# Patient Record
Sex: FEMALE | Employment: FULL TIME | URBAN - METROPOLITAN AREA
[De-identification: names, ages, dates, MRNs, and addresses within clinical notes are randomized per-mention and may not be internally consistent; named-entity substitution may affect disease eponyms.]

---

## 2019-05-22 ENCOUNTER — TELEPHONE (OUTPATIENT)
Dept: PSYCHIATRY | Facility: CLINIC | Age: 19
End: 2019-05-22

## 2019-05-22 NOTE — TELEPHONE ENCOUNTER
"PSYCHIATRY CLINIC PHONE INTAKE     SERVICES REQUESTED / INTERESTED IN          Med Management    Presenting Problem and Brief History                              What would you like to be seen for? (brief description):  Patient was diagnosed with depression about 3-4 years ago by her previous therapist. She has thoughts of jazmin \"here and there\" which have gotten worse within the past month. Jazmin lasts a couple days, \"not too extreme\", stays up all night, has racing thoughts, and a lot of energy. Previous therapist thought it might be cyclothymia but did not officially diagnose that. Past medications include citalopram and later lexapro with trazodone. Medications helped a lot but she stopped going to therapy and refilling prescriptions so she is no longer on any medication. She said she felt ok until the beginning of this year but is having symptoms again.  Have you received a mental health diagnosis? Yes   Which one (s): Depression  Is there any history of developmental delay?  No   Are you currently seeing a mental health provider?  Yes            Who / month last seen:  [facility]  Do you have mental health records elsewhere?  Yes  Will you sign a release so we can obtain them?  Yes    Have you ever been hospitalized for psychiatric reasons?  No  Describe:      Do you have current thoughts of self-harm?  Yes  - thoughts but no plan  Do you currently have thoughts of harming others?  No       Substance Use History     Do you have any history of alcohol / illicit drug use?  No  Describe:    Have you ever received treatment for this?  No    Describe:       Social History     Does the patient have a guardian?  No    Name / number:   Have you had an ACT team in last 12 months?  No  Describe:    Do you have any current or past legal issues?  No  Describe:    OK to leave a detailed voicemail?  Yes    Medical/ Surgical History                                 There is no problem list on file for this " patient.         Medications             No current outpatient medications on file.         DISPOSITION      Completed phone screen with patient. Added to AGE wait list for first available provider.    Ingris Bella,
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
## 2019-08-02 ENCOUNTER — OFFICE VISIT (OUTPATIENT)
Dept: PSYCHIATRY | Facility: CLINIC | Age: 19
End: 2019-08-02
Attending: PSYCHIATRY & NEUROLOGY
Payer: COMMERCIAL

## 2019-08-02 ENCOUNTER — TELEPHONE (OUTPATIENT)
Dept: PSYCHIATRY | Facility: CLINIC | Age: 19
End: 2019-08-02

## 2019-08-02 VITALS — SYSTOLIC BLOOD PRESSURE: 119 MMHG | DIASTOLIC BLOOD PRESSURE: 67 MMHG | HEART RATE: 80 BPM | WEIGHT: 102.8 LBS

## 2019-08-02 DIAGNOSIS — F32.A DEPRESSION, UNSPECIFIED DEPRESSION TYPE: Primary | ICD-10-CM

## 2019-08-02 PROBLEM — J45.909 ASTHMA: Status: ACTIVE | Noted: 2019-08-02

## 2019-08-02 RX ORDER — ALBUTEROL SULFATE 90 UG/1
2 AEROSOL, METERED RESPIRATORY (INHALATION) EVERY 6 HOURS PRN
Status: ON HOLD | COMMUNITY
End: 2021-12-16

## 2019-08-02 RX ORDER — ESCITALOPRAM OXALATE 10 MG/1
10 TABLET ORAL DAILY
Qty: 30 TABLET | Refills: 1 | Status: SHIPPED | OUTPATIENT
Start: 2019-08-02 | End: 2019-10-23

## 2019-08-02 ASSESSMENT — ANXIETY QUESTIONNAIRES
IF YOU CHECKED OFF ANY PROBLEMS ON THIS QUESTIONNAIRE, HOW DIFFICULT HAVE THESE PROBLEMS MADE IT FOR YOU TO DO YOUR WORK, TAKE CARE OF THINGS AT HOME, OR GET ALONG WITH OTHER PEOPLE: VERY DIFFICULT
6. BECOMING EASILY ANNOYED OR IRRITABLE: MORE THAN HALF THE DAYS
7. FEELING AFRAID AS IF SOMETHING AWFUL MIGHT HAPPEN: MORE THAN HALF THE DAYS
5. BEING SO RESTLESS THAT IT IS HARD TO SIT STILL: NEARLY EVERY DAY
2. NOT BEING ABLE TO STOP OR CONTROL WORRYING: MORE THAN HALF THE DAYS
1. FEELING NERVOUS, ANXIOUS, OR ON EDGE: MORE THAN HALF THE DAYS
3. WORRYING TOO MUCH ABOUT DIFFERENT THINGS: MORE THAN HALF THE DAYS
GAD7 TOTAL SCORE: 15

## 2019-08-02 ASSESSMENT — PATIENT HEALTH QUESTIONNAIRE - PHQ9
SUM OF ALL RESPONSES TO PHQ QUESTIONS 1-9: 23
5. POOR APPETITE OR OVEREATING: MORE THAN HALF THE DAYS

## 2019-08-02 NOTE — PATIENT INSTRUCTIONS
- Start Lexapro 10 mg daily    - Come back in 1 month    Thank you for coming to the PSYCHIATRY CLINIC.    Lab Testing:  If you had lab testing today and your results are reassuring or normal they will be mailed to you or sent through [employer] within 7 days.   If the lab tests need quick action we will call you with the results.  The phone number we will call with results is # [phone] (home) . If this is not the best number please call our clinic and change the number.    Medication Refills:  If you need any refills please call your pharmacy and they will contact us. Our fax number for refills is [phone]. Please allow three business for refill processing.   If you need to  your refill at a new pharmacy, please contact the new pharmacy directly. The new pharmacy will help you get your medications transferred.     Scheduling:  If you have any concerns about today's visit or wish to schedule another appointment please call our office during normal business hours [phone] (8-5:00 M-F)    Contact Us:  Please call [phone] during business hours (8-5:00 M-F).  If after clinic hours, or on the weekend, please call  [phone].    Financial Assistance [phone]  [facility] Billing [phone]  [facility] Billing [facility], [facility]: [phone]  [facility]illing [phone]  Medical Records [phone]      MENTAL HEALTH CRISIS NUMBERS:  [facility]:   [facility] - [phone]   Crisis Residence [facility] - [phone]   Walk-In Counseling [facility] [phone]   COPE 24/7 [place] Mobile Team for Adults - [[phone]]; Child - [[phone]]        [facility]:   [facility] - [phone]   Walk-in counseling [facility] - [phone]   Walk-in counseling [facility] - [phone]   Crisis Residence [facility] - [phone]   Urgent Care Adult Mental Health:   --Drop-in, 24/7  crisis line, and [facility] Mobile Team [[phone]]    CRISIS TEXT LINE: Text [phone] from anywhere, anytime, any crisis 24/7;    OR SEE www.crisistextline.org     Poison Control Center - [phone]    CHILD: [facility] needs assessment team - [phone]     [facility] Lifeline - [phone]; or [facility] Lifeline - [phone]    If you have a medical emergency please call 911or go to the nearest ER.                    _____________________________________________    Again thank you for choosing PSYCHIATRY CLINIC and please let us know how we can best partner with you to improve you and your family's health.  You may be receiving a survey in the mail regarding this appointment. We would love to have your feedback, both positive and negative, so please fill out the survey and return it using the provided envelope. The survey is done by an external company, so your answers are anonymous.

## 2019-08-02 NOTE — TELEPHONE ENCOUNTER
On 8/2/2019 the patient signed an YOVANI authorizing medical records to be released from [facility] to [facility]  for the purpose of continuing care. I faxed the request to [phone]. I sent the original to YOVANI to scanning and kept a copy in psychiatry until scanning is complete.  Nora Vickers, CMA

## 2019-08-03 ASSESSMENT — ANXIETY QUESTIONNAIRES: GAD7 TOTAL SCORE: 15

## 2019-09-04 ENCOUNTER — TELEPHONE (OUTPATIENT)
Dept: PSYCHIATRY | Facility: CLINIC | Age: 19
End: 2019-09-04

## 2019-09-04 ENCOUNTER — HOSPITAL ENCOUNTER (EMERGENCY)
Facility: CLINIC | Age: 19
Discharge: HOME | End: 2019-09-04
Attending: EMERGENCY MEDICINE | Admitting: EMERGENCY MEDICINE
Payer: COMMERCIAL

## 2019-09-04 VITALS
HEART RATE: 80 BPM | DIASTOLIC BLOOD PRESSURE: 54 MMHG | SYSTOLIC BLOOD PRESSURE: 95 MMHG | TEMPERATURE: 98.1 F | OXYGEN SATURATION: 100 % | RESPIRATION RATE: 18 BRPM

## 2019-09-04 DIAGNOSIS — N12 PYELONEPHRITIS: ICD-10-CM

## 2019-09-04 LAB
ALBUMIN UR-MCNC: 10 MG/DL
ANION GAP SERPL CALCULATED.3IONS-SCNC: 8 MMOL/L (ref 3–14)
APPEARANCE UR: ABNORMAL
BACTERIA #/AREA URNS HPF: ABNORMAL /HPF
BASOPHILS # BLD AUTO: 0 10E9/L (ref 0–0.2)
BASOPHILS NFR BLD AUTO: 0.4 %
BILIRUB UR QL STRIP: NEGATIVE
BUN SERPL-MCNC: 9 MG/DL (ref 7–30)
CALCIUM SERPL-MCNC: 8.2 MG/DL (ref 8.5–10.1)
CHLORIDE SERPL-SCNC: 102 MMOL/L (ref 94–109)
CO2 SERPL-SCNC: 29 MMOL/L (ref 20–32)
COLOR UR AUTO: ABNORMAL
CREAT SERPL-MCNC: 0.64 MG/DL (ref 0.52–1.04)
DIFFERENTIAL METHOD BLD: ABNORMAL
EOSINOPHIL # BLD AUTO: 0.6 10E9/L (ref 0–0.7)
EOSINOPHIL NFR BLD AUTO: 5 %
ERYTHROCYTE [DISTWIDTH] IN BLOOD BY AUTOMATED COUNT: 13.2 % (ref 10–15)
GFR SERPL CREATININE-BSD FRML MDRD: >90 ML/MIN/{1.73_M2}
GLUCOSE SERPL-MCNC: 84 MG/DL (ref 70–99)
GLUCOSE UR STRIP-MCNC: NEGATIVE MG/DL
HCG UR QL: NEGATIVE
HCT VFR BLD AUTO: 34.9 % (ref 35–47)
HGB BLD-MCNC: 11.2 G/DL (ref 11.7–15.7)
HGB UR QL STRIP: ABNORMAL
IMM GRANULOCYTES # BLD: 0 10E9/L (ref 0–0.4)
IMM GRANULOCYTES NFR BLD: 0.3 %
KETONES UR STRIP-MCNC: NEGATIVE MG/DL
LEUKOCYTE ESTERASE UR QL STRIP: ABNORMAL
LYMPHOCYTES # BLD AUTO: 2.3 10E9/L (ref 0.8–5.3)
LYMPHOCYTES NFR BLD AUTO: 21.4 %
MCH RBC QN AUTO: 29.2 PG (ref 26.5–33)
MCHC RBC AUTO-ENTMCNC: 32.1 G/DL (ref 31.5–36.5)
MCV RBC AUTO: 91 FL (ref 78–100)
MONOCYTES # BLD AUTO: 1.2 10E9/L (ref 0–1.3)
MONOCYTES NFR BLD AUTO: 10.9 %
MUCOUS THREADS #/AREA URNS LPF: PRESENT /LPF
NEUTROPHILS # BLD AUTO: 6.8 10E9/L (ref 1.6–8.3)
NEUTROPHILS NFR BLD AUTO: 62 %
NITRATE UR QL: NEGATIVE
NRBC # BLD AUTO: 0 10*3/UL
NRBC BLD AUTO-RTO: 0 /100
PH UR STRIP: 6 PH (ref 5–7)
PLATELET # BLD AUTO: 372 10E9/L (ref 150–450)
POTASSIUM SERPL-SCNC: 3.8 MMOL/L (ref 3.4–5.3)
RBC # BLD AUTO: 3.84 10E12/L (ref 3.8–5.2)
RBC #/AREA URNS AUTO: 3 /HPF (ref 0–2)
SODIUM SERPL-SCNC: 139 MMOL/L (ref 133–144)
SOURCE: ABNORMAL
SP GR UR STRIP: 1.01 (ref 1–1.03)
SQUAMOUS #/AREA URNS AUTO: 6 /HPF (ref 0–1)
UROBILINOGEN UR STRIP-MCNC: NORMAL MG/DL (ref 0–2)
WBC # BLD AUTO: 11 10E9/L (ref 4–11)
WBC #/AREA URNS AUTO: >182 /HPF (ref 0–5)

## 2019-09-04 PROCEDURE — 81001 URINALYSIS AUTO W/SCOPE: CPT | Performed by: FAMILY MEDICINE

## 2019-09-04 PROCEDURE — 80048 BASIC METABOLIC PNL TOTAL CA: CPT | Performed by: FAMILY MEDICINE

## 2019-09-04 PROCEDURE — 99284 EMERGENCY DEPT VISIT MOD MDM: CPT | Mod: 25 | Performed by: EMERGENCY MEDICINE

## 2019-09-04 PROCEDURE — 99284 EMERGENCY DEPT VISIT MOD MDM: CPT | Mod: Z6 | Performed by: EMERGENCY MEDICINE

## 2019-09-04 PROCEDURE — 96375 TX/PRO/DX INJ NEW DRUG ADDON: CPT | Performed by: EMERGENCY MEDICINE

## 2019-09-04 PROCEDURE — 87186 SC STD MICRODIL/AGAR DIL: CPT

## 2019-09-04 PROCEDURE — 81025 URINE PREGNANCY TEST: CPT | Performed by: FAMILY MEDICINE

## 2019-09-04 PROCEDURE — 85025 COMPLETE CBC W/AUTO DIFF WBC: CPT | Performed by: FAMILY MEDICINE

## 2019-09-04 PROCEDURE — 96365 THER/PROPH/DIAG IV INF INIT: CPT | Performed by: EMERGENCY MEDICINE

## 2019-09-04 PROCEDURE — 87088 URINE BACTERIA CULTURE: CPT

## 2019-09-04 PROCEDURE — 87086 URINE CULTURE/COLONY COUNT: CPT

## 2019-09-04 PROCEDURE — 25000128 H RX IP 250 OP 636: Performed by: EMERGENCY MEDICINE

## 2019-09-04 RX ORDER — CEFTRIAXONE 1 G/1
1 INJECTION, POWDER, FOR SOLUTION INTRAMUSCULAR; INTRAVENOUS ONCE
Status: COMPLETED | OUTPATIENT
Start: 2019-09-04 | End: 2019-09-04

## 2019-09-04 RX ORDER — CEFDINIR 300 MG/1
300 CAPSULE ORAL 2 TIMES DAILY
Qty: 20 CAPSULE | Refills: 0 | Status: SHIPPED | OUTPATIENT
Start: 2019-09-04 | End: 2019-09-14

## 2019-09-04 RX ORDER — KETOROLAC TROMETHAMINE 15 MG/ML
15 INJECTION, SOLUTION INTRAMUSCULAR; INTRAVENOUS ONCE
Status: COMPLETED | OUTPATIENT
Start: 2019-09-04 | End: 2019-09-04

## 2019-09-04 RX ADMIN — KETOROLAC TROMETHAMINE 15 MG: 15 INJECTION, SOLUTION INTRAMUSCULAR; INTRAVENOUS at 04:32

## 2019-09-04 RX ADMIN — CEFTRIAXONE SODIUM 1 G: 1 INJECTION, POWDER, FOR SOLUTION INTRAMUSCULAR; INTRAVENOUS at 04:53

## 2019-09-04 NOTE — ED TRIAGE NOTES
Pt states recently treated for UTI was better but now her signs and symptoms are back and she thinks she has another UTI.

## 2019-09-04 NOTE — ED AVS SNAPSHOT
[facility], Emergency Department  [address]  [facility] [address]  Phone:  [phone]  Fax:  [phone]                                    Ayde Hughes   MRN: [id number]    Department:  [facility], Emergency Department   Date of Visit:  9/4/2019           After Visit Summary Signature Page    I have received my discharge instructions, and my questions have been answered. I have discussed any challenges I see with this plan with the nurse or doctor.    ..........................................................................................................................................  Patient/Patient Representative Signature      ..........................................................................................................................................  Patient Representative Print Name and Relationship to Patient    ..................................................               ................................................  Date                                   Time    ..........................................................................................................................................  Reviewed by Signature/Title    ...................................................              ..............................................  Date                                               Time          22EPIC Rev 08/18

## 2019-09-04 NOTE — TELEPHONE ENCOUNTER
Brief phone note    Reached out to patient after she did not come in for her clinic appointment today. No answer, left a voicemail asking to reschedule. She was seen in the ED early this morning for pyelonephritis, which would certainly explain her absence.     Richardson Monahan MD, Psychiatry PGY3

## 2019-09-04 NOTE — ED PROVIDER NOTES
History     Chief Complaint   Patient presents with     Flank Pain     HPI  Ayde Hughes is a 19 year old female who resents with right flank pain and dysuria for the last 2-3 days.  She states she had dysuria a little over a week ago, was seen at [facility], treated with a 5-day course of antibiotics for UTI.  She does not recall what the antibiotic was.  She states symptoms resolved, but a few days ago started come back.  She states that she is now also had increasing right flank/CVA pain.  No definite fevers, but her significant other states she felt hot yesterday.  She had some nausea but no vomiting.  No abnormal vaginal discharge or complaints.  She has had a mild cough lately but no shortness of breath.    No past medical history on file.    No past surgical history on file.    No family history on file.    Social History     Tobacco Use     Smoking status: Never Smoker     Smokeless tobacco: Never Used   Substance Use Topics     Alcohol use: Not on file         I have reviewed the Medications, Allergies, Past Medical and Surgical History, and Social History in the Epic system.    Review of Systems    Physical Exam   BP: 106/60  Pulse: 86  Temp: 97.9  F (36.6  C)  Resp: 16  SpO2: 100 %      Physical Exam   Constitutional: No distress.   HENT:   Head: Atraumatic.   Mouth/Throat: Oropharynx is clear and moist. No oropharyngeal exudate.   Eyes: Pupils are equal, round, and reactive to light. No scleral icterus.   Cardiovascular: Normal heart sounds and intact distal pulses.   Pulmonary/Chest: Breath sounds normal. No respiratory distress.   Abdominal: Soft. Bowel sounds are normal. There is no tenderness.   Musculoskeletal: She exhibits tenderness (Tenderness over the CVA on the right with percussion.  Mild right upper abdomen tenderness with palpation.). She exhibits no edema.   Skin: Skin is warm. No rash noted. She is not diaphoretic.       ED Course        Procedures             Critical Care  time:  none             Labs Ordered and Resulted from Time of ED Arrival Up to the Time of Departure from the ED   UA MACROSCOPIC WITH REFLEX TO MICRO AND CULTURE - Abnormal; Notable for the following components:       Result Value    Blood Urine Small (*)     Protein Albumin Urine 10 (*)     Leukocyte Esterase Urine Large (*)     RBC Urine 3 (*)     WBC Urine >182 (*)     Bacteria Urine Few (*)     Squamous Epithelial /HPF Urine 6 (*)     Mucous Urine Present (*)     All other components within normal limits   CBC WITH PLATELETS DIFFERENTIAL - Abnormal; Notable for the following components:    Hemoglobin 11.2 (*)     Hematocrit 34.9 (*)     All other components within normal limits   BASIC METABOLIC PANEL - Abnormal; Notable for the following components:    Calcium 8.2 (*)     All other components within normal limits   HCG QUALITATIVE URINE   URINE CULTURE AEROBIC BACTERIAL            Assessments & Plan (with Medical Decision Making)   Patient appears to have pyelonephritis.  I did give a dose of ceftriaxone here, will discharge with prescription for Omnicef.  She has not had vomiting.  I strongly encouraged her to return to the ER with new or worsening symptoms.  She verbalizes understanding is agreeable to the plan.    Dictation Disclaimer: Some of this Note has been completed with voice-recognition dictation software. Although errors are generally corrected real-time, there is the potential for a rare error to be present in the completed chart.      I have reviewed the nursing notes.    I have reviewed the findings, diagnosis, plan and need for follow up with the patient.    Discharge Medication List as of 9/4/2019  5:39 AM      START taking these medications    Details   cefdinir (OMNICEF) 300 MG capsule Take 1 capsule (300 mg) by mouth 2 times daily for 10 days, Disp-20 capsule, R-0, Local Print             Final diagnoses:   Pyelonephritis       9/4/2019   [facility], [place], EMERGENCY DEPARTMENT     Siria  Deirdre Caballero MD  09/04/19 0709

## 2019-09-05 LAB
BACTERIA SPEC CULT: ABNORMAL
Lab: ABNORMAL
SPECIMEN SOURCE: ABNORMAL

## 2019-09-05 NOTE — RESULT ENCOUNTER NOTE
Final Urine Culture Report on 9/5/19  Emergency Dept/Urgent Care discharge antibiotic prescribed: Cefdinir (Omnicef) 300 mg capsule, 1 capsule (300 mg) by mouth 2 times daily for 10 days  #1. Bacteria, >100,000 colonies/mL Staphylococcus Saprophyticus, is SUSCEPTIBLE to Antibiotic.    As per [place] ED Lab Result protocol, no change in antibiotic therapy.

## 2019-09-05 NOTE — RESULT ENCOUNTER NOTE
Emergency Dept/Urgent Care discharge antibiotic (if prescribed): Cefdinir (Omnicef) 300 mg capsule, 1 capsule (300 mg) by mouth 2 times daily for 10 days  Date of Rx (if applicable):  9/4/19  No changes in treatment per Urine culture protocol.

## 2019-09-06 ENCOUNTER — TELEPHONE (OUTPATIENT)
Dept: EMERGENCY MEDICINE | Facility: CLINIC | Age: 19
End: 2019-09-06

## 2019-09-06 NOTE — TELEPHONE ENCOUNTER
[facility] Emergency Department/Urgent Care Lab result notification:    Reason for call  Notify of lab results, assess symptoms,  review ED providers recommendations (if necessary) and advise per ED lab result f/u protocol.    Lab result  Final Urine Culture Report on 9/5/19  Emergency Dept/Urgent Care discharge antibiotic prescribed: Cefdinir (Omnicef) 300 mg capsule, 1 capsule (300 mg) by mouth 2 times daily for 10 days  #1. Bacteria, >100,000 colonies/mL Staphylococcus Saprophyticus, is SUSCEPTIBLE to Antibiotic.    As per [place] ED Lab Result protocol, no change in antibiotic therapy.  Left voicemail message requesting a call back to [phone] between 10 a.m. and 6:30 p.m. for patient's ED/UC lab results.    PCP follow-up Questions asked: NO    Roula Laureano RN    Sudiksha   Lung Nodule and ED Lab Results F/U RN  Epic pool (ED late result f/u RN) : P [id number]   # [phone]

## 2019-10-22 DIAGNOSIS — F32.A DEPRESSION, UNSPECIFIED DEPRESSION TYPE: ICD-10-CM

## 2019-10-23 RX ORDER — ESCITALOPRAM OXALATE 10 MG/1
10 TABLET ORAL DAILY
Qty: 30 TABLET | Refills: 0 | Status: SHIPPED | OUTPATIENT
Start: 2019-10-23 | End: 2019-12-06

## 2019-10-23 NOTE — TELEPHONE ENCOUNTER
Medication requested:  escitalopram (LEXAPRO) 10 MG tablet  Last refilled: 8/2/2019  Qty: 30/1      Last seen: 8/2/2019  RTC: 1 month  Cancel: 0  No-show: 1  Next appt: 11/8/2019    Refill decision:   Refill pended and routed to the provider for review/determination due to  NSH x 1

## 2019-12-06 ENCOUNTER — TELEPHONE (OUTPATIENT)
Dept: PSYCHIATRY | Facility: CLINIC | Age: 19
End: 2019-12-06

## 2019-12-06 DIAGNOSIS — F32.A DEPRESSION, UNSPECIFIED DEPRESSION TYPE: ICD-10-CM

## 2019-12-06 RX ORDER — ESCITALOPRAM OXALATE 10 MG/1
10 TABLET ORAL DAILY
Qty: 30 TABLET | Refills: 0 | Status: SHIPPED | OUTPATIENT
Start: 2019-12-06 | End: 2020-01-03

## 2019-12-06 NOTE — TELEPHONE ENCOUNTER
[facility] Call Center    Phone Message    May a detailed message be left on voicemail: yes    Reason for Call: Medication Refill Request    Has the patient contacted the pharmacy for the refill? Yes   Name of medication being requested: Lexapro  Provider who prescribed the medication: Richardson Monahan  Pharmacy: [facility]  Date medication is needed: Has a couple of pills left      Action Taken: Other: [facility] Pool

## 2019-12-06 NOTE — TELEPHONE ENCOUNTER
"Brief psychiatry phone note:    Reached out to patient this morning. She was not able to make it to our appointment because she couldn't find the building, and she has since rescheduled for next week. She is feeling much better on the Lexapro. Her depression has improved, and her periods of increased energy have resolved. She feels more \"leveled out\". She had no specific concerns to address today. I will provide a refill of Lexapro, and I will plan on seeing her in clinic on 12/13.    Richardson Monahan MD  Psychiatry PGY3    "

## 2019-12-11 NOTE — TELEPHONE ENCOUNTER
Writer received incoming call from patient, requesting a refill for Lexapro. Informed the patient that Lexapro refill was sent to [id number] - [place], [place] - [address] on 12/6/19. Patient agreed to refill the mediation today. She is also requesting to cancel her appt on 10/13/19 and reschedule on 1/3/20 at 9 pm. Scheduling notified and provider notified.

## 2020-01-02 NOTE — PROGRESS NOTES
"     [facility]  Psychiatry Clinic  PSYCHIATRIC PROGRESS NOTE/BARC     Date of initial Diagnostic Assessment (DA) is 08/02/2019 and most recent Transfer of Care DA is N/A.    CARE TEAM:  PCP- Marvin French   Therapist- Ashia White at [facility] Team- none.              Ayde Hughes is a 19 year old female who prefers the name Ayde & pronouns she, her.      Pertinent Background:  Previous diagnoses include BRIANNE and MDD.  Notably, first MDE in 6th grade. Good response to Lexapro in the past, though never attained full remission of depression. Most recent MDE began Fall 2018. Diurnal variation (worse in AM). Symptoms worsen with menstrual cycle and in the Winter. Short periods of possible hypomania that last only a few hours, with one that may have lasted 4 days, which improved with Lexapro.     Psych critical item history includes suicidal ideation, SIB [cutting] and SUBSTANCE USE: cannabis.    .   INTERIM HISTORY      [4, 4]   The patient reports good treatment adherence.  History was provided by the patient who was a good historian.  The last visit ended with the following med change: Started Lexapro 10 mg daily.    Since the last visit:   - Ayde reports that her symptoms have greatly improved after starting Lexapro, describing that it was \"life changing\". Mood has improved. She still has some negative emotions, but these have been less intense. She is more resilient to stressors.  -  Energy has also improved. She was previously only able to work part time due to low mood and low energy, but she has now been working full time at [employer]  - Depression has not fully remitted. She has some low level mood in the background, with periods lasting days to 1 week when her mood worsens  - Suicide thoughts have improved. She still has passive suicide thoughts occasionally, but these have been brief  - Anxiety has improved. This tends to go along with her " "depressive symptoms. She typically worries about a variety of things, including finances, college, career, and existential questions. She has notices this has not bothered her as much recently  - Has not cut since last Summer. She previously cut to punish herself. She has noticed she is now \"nicer\" to herself on Lexapro, and she doesn't feel the need to punish herself when she makes mistakes  - The brief, elevated mood states have mostly resolved. There are periods when she feels happy and energetic, but she feels this is consistent with the normal range of human emotions      RECENT PSYCH ROS:   Depression:  depressed mood  Elevated:  none  Psychosis:  none  Anxiety:  excessive worry  Trauma Related:  none  Dysregulation:  none  Eating Disorder: no  Pertinent Negative Symptoms:  NO psychosis and hallucinations       RECENT SUBSTANCE USE:     Alcohol- once every 3 weeks, will have 2-3 drinks per drinking episode  Tobacco- none  Caffeine- Rare  Opioids- None           Narcan Kit- NA   Cannabis- Marijuana daily. Has cut down quite a bit   Other illicit drugs- none    CURRENT SOCIAL HISTORY:  Financial/ Work- Working full time at [employer]  Partner/ - Boyfriend  Children- no  Living situation- Lives alone in a studio in [place]      Social/ Spiritual Support- Close to dad's side of the family, though has not spoken to dad in many years. Also has support from mother, boyfriend, and a few close friends. Does not speak [place], but still relates to the [place] community.   Feels safe at home - yes     PSYCH and CD Critical Summary Points since July 2019 08/02/2019 - started Lexapro 10 mg daily  1/3/2020 - Increased Lexapro to 20 mg daily    PAST MED TRIALS          See last Diagnostic Assessment     MEDICAL / SURGICAL HISTORY         Pregnant or breastfeeding- no      Contraception- Did not assess    Patient Active Problem List   Diagnosis     Asthma     Depression, unspecified depression type          " Major Surgery- no     MEDICAL REVIEW OF SYSTEMS    [2, 10]     A comprehensive review of systems was performed and is negative other than noted above    ALLERGY      Kiwi extract and Cats  MEDICATIONS            Current Outpatient Medications:      albuterol (PROAIR HFA/PROVENTIL HFA/VENTOLIN HFA) 108 (90 Base) MCG/ACT inhaler, Inhale 2 puffs into the lungs every 6 hours as needed for shortness of breath / dyspnea or wheezing, Disp: , Rfl:      escitalopram (LEXAPRO) 10 MG tablet, Take 1 tablet (10 mg) by mouth daily, Disp: 30 tablet, Rfl: 0     ranitidine (ZANTAC) 150 MG tablet, Take 150 mg by mouth 2 times daily , Disp: , Rfl:     VITALS       [3, 3]   /75   Pulse 72   Wt 43.8 kg (96 lb 9.6 oz)    MENTAL STATUS EXAM     [9, 14 cog gs]     Alertness: alert   Appearance: well groomed  Behavior/Demeanor: cooperative, with good  eye contact   Speech: normal  Language: intact  Psychomotor: normal or unremarkable  Mood: depressed  Affect: restricted, dysthymic; was congruent to mood; was congruent to content  Thought Process/Associations: unremarkable  Thought Content:  Reports Passive SI;  Denies paranoid ideation  Perception:  Reports none;  Denies auditory hallucinations and visual hallucinations  Insight: good  Judgment: good  Cognition: (6) oriented: time, person, and place  attention span: intact  concentration: intact  recent memory: intact  remote memory: intact  fund of knowledge: appropriate  Gait and Station: unremarkable    LABS and DATA     AIMS:  not needed    PHQ9 TODAY = 17  PHQ-9 SCORE 8/2/2019   PHQ-9 Total Score 23       Recent Labs   Lab Test 09/04/19  0219   CR 0.64   GFRESTIMATED >90     No lab results found.    PSYCHOTROPIC DRUG INTERACTIONS     None    MANAGEMENT:  Monitoring for adverse effects    RISK STATEMENT for SAFETY     Ayde Hughes endorses suicidal ideation consisting of intermittent, passive SI.        There are notable risk factors for self-harm including History of SIB.   However, risk is mitigated by no h/o suicide attempt, no plan or intent, no access to lethal means, future oriented, commitment to family, good social support  , stable housing and good job situation.  Based on all available evidence she does not appear to be at imminent risk for self-harm therefore does not meet criteria for a 72-hr hold/  involuntary hospitalization.  However, based on degree of symptoms therapy and close psych FU was recommended which the pt did agree to.  Additional steps to minimize risk include: frequent clinic visits and med changes.    PSYCHIATRIC DIAGNOSES                                           Unspecified Depressive Disorder (most likely MDD)  R/o Generalized anxiety disorder    ASSESSMENT    [m2, h3]     TODAY  Ayde reports significant improvement in her depression with Lexapro 10 mg daily. Her mood, energy, resilience, and anhedonic symptoms have all improved. The brief hypomanic-like episodes have likewise improved, which makes a diagnosis of Bipolar disorder less likely. Diagnostic formulation at this point is most likely MDD. Anxiety has also improved. She carries a historical diagnosis of BRIANNE, though it is unclear if she currently meets diagnostic criteria for this disorder. She has some excessive worry, though does not appear to have other aspects of BRIANNE, including restlessness, poor concentration, irritability, or muscle tension. Will need to follow her over time for diagnostic clarification.     She continues to use marijuana, though she has cut down as her mental health symptoms have improved. She is pre-contemplative in quitting at this point. Will continue to address in future sessions.     Given continued depressive symptoms, will increase her Lexapro to 20 mg daily. The risks and benefits were discussed. She experienced some sedation and GI upset with initially starting Lexapro which have since resolved. She was counseled that these side effects may temporarily return  with the dose increase, but they will hopefully be transient.      PLAN        [m2, h3]     1) PSYCHOTROPIC MEDICATIONS:  - Increase Lexapro to 20 mg daily    2) [place]  was [place] checked today:  not using controlled substances.    3) THERAPY:    Not currently in therapy. Contemplating restarting with previous therapist    4) NEXT DUE:    Labs- NA  EKG- NA  Rating Scales- NA    5) REFERRALS:    No Referrals needed     6) RTC: 1 month    7) CRISIS NUMBERS:   Provided routinely in AVS   ONLY if a [facility]: [facility] [place] [phone] (clinic), [phone] (after hours)     TREATMENT RISK STATEMENT:  The risks, benefits, alternatives and potential adverse effects have been discussed and are understood by the pt. The pt understands the risks of using street drugs or alcohol. There are no medical contraindications, the pt agrees to treatment with the ability to do so. The pt knows to call the clinic for any problems or to access emergency care if needed.  Medical and substance use concerns are documented above.  Psychotropic drug interaction check was done, including changes made today.    PROVIDER: Richardson Monahan MD    Patient staffed in clinic with Dr. Box who will sign the note.  Supervisor is Dr. Trivedi.    Attestation:  I, Gigi Box MD, personally performed an examination of this patient on January 3, 2020 and I have reviewed the resident's documentation.  I have edited the note to reflect all relevant changes. I agree with resident findings and plan in this resident H&P.  Since the last visit, Ayde reports that her depressive symptoms and functioning have improved markedly with the addition of Lexapro 10 mg daily.  She is tolerating it well.  Her depression has not fully resolved and she is agreeable to increase the dose of Lexapro to 20 mg daily.  We counseled her regarding cutting back on her marijuana use.  She will return for follow-up in 1 month.    Gigi Box MD

## 2020-01-03 ENCOUNTER — OFFICE VISIT (OUTPATIENT)
Dept: PSYCHIATRY | Facility: CLINIC | Age: 20
End: 2020-01-03
Attending: PSYCHIATRY & NEUROLOGY
Payer: COMMERCIAL

## 2020-01-03 VITALS — DIASTOLIC BLOOD PRESSURE: 75 MMHG | SYSTOLIC BLOOD PRESSURE: 118 MMHG | HEART RATE: 72 BPM | WEIGHT: 96.6 LBS

## 2020-01-03 DIAGNOSIS — F32.A DEPRESSION, UNSPECIFIED DEPRESSION TYPE: Primary | ICD-10-CM

## 2020-01-03 PROCEDURE — G0463 HOSPITAL OUTPT CLINIC VISIT: HCPCS | Mod: ZF

## 2020-01-03 RX ORDER — ESCITALOPRAM OXALATE 20 MG/1
20 TABLET ORAL DAILY
Qty: 30 TABLET | Refills: 1 | Status: SHIPPED | OUTPATIENT
Start: 2020-01-03 | End: 2020-03-31

## 2020-01-03 ASSESSMENT — PAIN SCALES - GENERAL: PAINLEVEL: NO PAIN (0)

## 2020-01-03 ASSESSMENT — PATIENT HEALTH QUESTIONNAIRE - PHQ9: SUM OF ALL RESPONSES TO PHQ QUESTIONS 1-9: 17

## 2020-03-30 DIAGNOSIS — F32.A DEPRESSION, UNSPECIFIED DEPRESSION TYPE: ICD-10-CM

## 2020-03-31 RX ORDER — ESCITALOPRAM OXALATE 20 MG/1
20 TABLET ORAL DAILY
Qty: 30 TABLET | Refills: 0 | Status: SHIPPED | OUTPATIENT
Start: 2020-03-31 | End: 2020-04-06

## 2020-03-31 NOTE — TELEPHONE ENCOUNTER
"Medication requested:  escitalopram (LEXAPRO) 20 MG tablet   Last refilled: 1/3/20  Qty: 30/1      Last seen: 1/3/20\" Increase Lexapro to 20 mg daily\"    RTC: 1 month Cancel: 0  No-show: 0  Next appt: None    Refill decision: Refill pended and routed to the provider for review/determination due to outside of timeframe, no appt. Scheduling has been notified to contact the pt for appointment.          "

## 2020-04-06 ENCOUNTER — VIRTUAL VISIT (OUTPATIENT)
Dept: PSYCHIATRY | Facility: CLINIC | Age: 20
End: 2020-04-06
Attending: PSYCHIATRY & NEUROLOGY
Payer: COMMERCIAL

## 2020-04-06 DIAGNOSIS — F32.A DEPRESSION, UNSPECIFIED DEPRESSION TYPE: ICD-10-CM

## 2020-04-06 RX ORDER — ESCITALOPRAM OXALATE 20 MG/1
20 TABLET ORAL DAILY
Qty: 30 TABLET | Refills: 2 | Status: SHIPPED | OUTPATIENT
Start: 2020-04-06 | End: 2020-08-18

## 2020-04-06 NOTE — PROGRESS NOTES
"     [facility]  Psychiatry Clinic  PSYCHIATRIC PROGRESS NOTE/BARC     Date of initial Diagnostic Assessment (DA) is 08/02/2019 and most recent Transfer of Care DA is N/A.    CARE TEAM:  PCP- Marvin French   Therapist- Ashia White at [facility] Team- none.              Ayde Hughes is a 20 year old female who prefers the name Ayde & pronouns she, her.      Pertinent Background:  Previous diagnoses include Generalized anxiety disorder and Major depressive disorder.  Notably, first MDE in 6th grade. Most recent MDE began Fall 2018. Diurnal variation (worse in AM). Symptoms worsen with menstrual cycle and in the Winter. Short periods of possible hypomania that last only a few hours, with one that may have lasted 4 days. In retrospect, these may be better explained by normal mood lability. Depression has responded well to Lexapro, and periods of possible hypomania have resolved.     Psych critical item history includes suicidal ideation, SIB [cutting] and SUBSTANCE USE: cannabis.    .   INTERIM HISTORY      [4, 4]   The patient reports good treatment adherence.  History was provided by the patient who was a good historian.  The last visit ended with the following med change: Increased Lexapro to 20 mg daily.    Since the last visit:   - Ayde reports that things are going \"really good\". She initially had some side effects with the higher dose of Lexapro, including vivid dreams and hypersomnia. These have fortunately now resolved. She noticed an improvement in her mood about 2 weeks after increasing the Lexapro dose. She feels that her depression is for the most part in remission. She still has some moments of low mood, but they are relatively minor. She is very happy with her response to the medication. \"I feel like this is my drug\"  - She is not working until at least May 3rd, but [employer] is still paying her salary until that point. She has been trying to " stay active as much as possible, including getting outside and doing some socializing. It helps that she has a roommate. She has also taken up knitting   - Denied suicide thoughts and SIB    RECENT PSYCH ROS:   Depression:  None  Elevated:  none  Psychosis:  none  Anxiety:  None  Trauma Related:  none  Dysregulation:  none  Eating Disorder: no  Pertinent Negative Symptoms:  NO psychosis and hallucinations       RECENT SUBSTANCE USE:     Alcohol- once every 3 weeks, will have 2-3 drinks per drinking episode  Tobacco- none  Caffeine- Rare  Opioids- None           Narcan Kit- NA   Cannabis- Marijuana daily  Other illicit drugs- none    CURRENT SOCIAL HISTORY:  Financial/ Work- employed full time at [employer] - not currently working during the coronavirus pandemic, but [employer] is still paying her normal salary  Partner/ - Boyfriend  Children- no  Living situation- Living with a roommate in [place]     Social/ Spiritual Support- Close to dad's side of the family, though has not spoken to dad in many years. Also has support from mother, boyfriend, and a few close friends. Does not speak Indonesian, but still relates to the Indonesian community.   Feels safe at home - yes     PSYCH and CD Critical Summary Points since July 2019 08/02/2019 - Started Lexapro 10 mg daily  1/3/2020 - Increased Lexapro to 20 mg daily    PAST MED TRIALS          See last Diagnostic Assessment     MEDICAL / SURGICAL HISTORY         Pregnant or breastfeeding- no      Contraception- Did not assess    Patient Active Problem List   Diagnosis     Asthma     Depression, unspecified depression type          Major Surgery- no     MEDICAL REVIEW OF SYSTEMS    [2, 10]     A comprehensive review of systems was performed and is negative other than noted above    ALLERGY      Kiwi extract and Cats  MEDICATIONS            Current Outpatient Medications:      albuterol (PROAIR HFA/PROVENTIL HFA/VENTOLIN HFA) 108 (90 Base) MCG/ACT inhaler, Inhale  2 puffs into the lungs every 6 hours as needed for shortness of breath / dyspnea or wheezing, Disp: , Rfl:      escitalopram (LEXAPRO) 20 MG tablet, Take 1 tablet (20 mg) by mouth daily, Disp: 30 tablet, Rfl: 0     ranitidine (ZANTAC) 150 MG tablet, Take 150 mg by mouth 2 times daily , Disp: , Rfl:     VITALS       [3, 3]   There were no vitals taken for this visit.   MENTAL STATUS EXAM     [9, 14 cog gs]     Alertness: Unable to fully assess on the phone, voices sounded alert  Appearance: Unable to assess  Behavior/Demeanor: Unable to assess   Speech: normal  Language: intact  Psychomotor: Unable to assess  Mood: description consistent with euthymia  Affect: Unable to fully assess on the phone, voice was bright, full range; was congruent to mood; was congruent to content  Thought Process/Associations: unremarkable  Thought Content:  Reports none;  Denies suicidal ideation  Perception:  Reports none;  Denies auditory hallucinations and visual hallucinations  Insight: good  Judgment: good  Cognition: (6) oriented: time, person, and place  attention span: intact  concentration: intact  recent memory: intact  remote memory: intact  fund of knowledge: appropriate  Gait and Station: Unable to assess    LABS and DATA     AIMS:  not needed    PHQ9 TODAY = not obtained today  PHQ-9 SCORE 8/2/2019 1/3/2020   PHQ-9 Total Score 23 17       Recent Labs   Lab Test 09/04/19  0219   CR 0.64   GFRESTIMATED >90     No lab results found.    PSYCHOTROPIC DRUG INTERACTIONS     None    MANAGEMENT:  Monitoring for adverse effects    RISK STATEMENT for SAFETY     Ayde Hughes did not appear to be an imminent safety risk to self or others.     PSYCHIATRIC DIAGNOSES                                           Major depressive disorder, recurrent, in full remission  R/o Generalized anxiety disorder    ASSESSMENT    [m2, h3]     TODAY  Ayde has had a very positive response to Lexapro. She feels that most of her depression is now gone, aside  "form some occasional low periods.    The brief periods of possible elevated mood states have also resolved. In retrospect, this are more likely attributable to normal mood lability. Will closely monitor for any occurrence hypomanic symptoms. Given her robust response, will continue her Lexapro at the current dose today.      PLAN        [m2, h3]     1) PSYCHOTROPIC MEDICATIONS:  - Lexapro 20 mg daily    2) [place]  was [place] checked today:  not using controlled substances.    3) THERAPY:    None currently    4) NEXT DUE:    Labs- NA  EKG- NA  Rating Scales- NA    5) REFERRALS:    No Referrals needed     6) RTC: 2-3 months    7) CRISIS NUMBERS:   Provided routinely in AVS   ONLY if a [facility] PT: [facility] [phone] (clinic), [phone] (after hours)     TREATMENT RISK STATEMENT:  The risks, benefits, alternatives and potential adverse effects have been discussed and are understood by the pt. The pt understands the risks of using street drugs or alcohol. There are no medical contraindications, the pt agrees to treatment with the ability to do so. The pt knows to call the clinic for any problems or to access emergency care if needed.  Medical and substance use concerns are documented above.  Psychotropic drug interaction check was done, including changes made today.    PROVIDER: Richardson Monahan MD    Patient staffed with Dr. Encarnacion who will sign the note.  Supervisor is Dr. Trivedi.    Start Time:   8:03 am                    End Time: 8:25 am    Ayde Hughes is a 20 year old female who is being evaluated via a billable telephone visit.      The patient has been notified of following:     \"This telephone visit will be conducted via a call between you and your physician/provider. We have found that certain health care needs can be provided without the need for a physical exam.  This service lets us provide the care you need with a short phone conversation.  If a prescription is necessary we can send it " "directly to your pharmacy.  If lab work is needed we can place an order for that and you can then stop by our lab to have the test done at a later time.    If during the course of the call the physician/provider feels a telephone visit is not appropriate, you will not be charged for this service.\"     Patient has given verbal consent for Telephone visit?  Yes    Ayde Hughes complains of    Chief Complaint   Patient presents with     RECHECK       I have reviewed and updated the patient's Past Medical History, Social History, Family History and Medication List.    ALLERGIES  Kiwi extract and Cats    Additional provider notes: See above    Phone call duration: 22 minutes    Richardson Monahan MD    TELEHEALTH ATTENDING ATTESTATION  Following the ACGME guidelines on telemedicine and direct supervision due to COVID-19, I was concurrently participating in and/or monitoring the patient care through appropriate telecommunication technology.  I discussed the key portions of the service with the resident, including the mental status examination and developing the plan of care. I reviewed key portions of the history with the resident. I agree with the findings and plan as documented in this note.   Pollo Encarnacion MD    "

## 2020-05-27 ENCOUNTER — TELEPHONE (OUTPATIENT)
Dept: PSYCHIATRY | Facility: CLINIC | Age: 20
End: 2020-05-27

## 2020-05-27 NOTE — TELEPHONE ENCOUNTER
Psychiatry phone note:    Called patient at the time of our visit today. The phone number listed is not in service. I also sent a doxy invite to her email, but did not get a response.    Richardson Monahan MD  Psychiatry PGY3

## 2020-06-15 ENCOUNTER — TELEPHONE (OUTPATIENT)
Dept: PSYCHIATRY | Facility: CLINIC | Age: 20
End: 2020-06-15

## 2020-06-15 NOTE — TELEPHONE ENCOUNTER
I called the patient for our scheduled appointment today. The number listed is not in service. I also sent a Doxy invite, and I did not get a response.    Richardson Monahan MD  Psychiatry PGY3

## 2020-08-15 ENCOUNTER — VIRTUAL VISIT (OUTPATIENT)
Dept: FAMILY MEDICINE | Facility: OTHER | Age: 20
End: 2020-08-15
Payer: COMMERCIAL

## 2020-08-15 PROCEDURE — 99421 OL DIG E/M SVC 5-10 MIN: CPT | Performed by: PHYSICIAN ASSISTANT

## 2020-08-16 NOTE — PROGRESS NOTES
"Date: 08/15/2020 20:25:28  Clinician: Shreya Quiroga  Clinician NPI: [id number]  Patient: Ayde Hughes  Patient : 2000  Patient Address[address] [facility] [address] [id number]  Patient Phone: [phone]  Visit Protocol: URI  Patient Summary:  Ayde is a 20 year old ( : 2000 ) female who initiated a Visit for COVID-19 (Coronavirus) evaluation and screening. When asked the question \"Please sign me up to receive news, health information and promotions from [employer].\", Ayde responded \"No\".    Ayde states her symptoms started 1-2 days ago.   Her symptoms consist of a headache, chills, nausea, a sore throat, diarrhea, vomiting, myalgia, and malaise. Ayde also feels feverish but was unable to measure her temperature.   Symptom details     Sore throat: Adye reports having moderate throat pain (4-6 on a 10 point pain scale), does not have exudate on her tonsils, and can swallow liquids. She is not sure if the lymph nodes in her neck are enlarged. A rash has not appeared on the skin since the sore throat started.     Headache: She states the headache is severe (7-9 on a 10 point pain scale).      Ayde denies having ear pain, teeth pain, ageusia, cough, nasal congestion, rhinitis, anosmia, facial pain or pressure, and wheezing. She also denies having a sinus infection within the past year, taking antibiotic medication in the past month, and having recent facial or sinus surgery in the past 60 days. She is not experiencing dyspnea.   Precipitating events  Within the past week, Ayde has not been exposed to someone with strep throat. She has not recently been exposed to someone with influenza. Ayde has not been in close contact with any high risk individuals.   Pertinent COVID-19 (Coronavirus) information  In the past 14 days, Ayde has not worked in a congregate living setting.   She does not work or volunteer as healthcare worker or a  and does not work or volunteer in a healthcare " facility.   Ayde also has not lived in a congregate living setting in the past 14 days. She does not live with a healthcare worker.   Ayde has not had a close contact with a laboratory-confirmed COVID-19 patient within 14 days of symptom onset.   Since December 2019, Ayde and has had upper respiratory infection (URI) or influenza-like illness. Has not been diagnosed with lab-confirmed COVID-19 test      Date(s) of previous URI or influenza-like illness (free-text): 1/05/2020     Symptoms Ayde experienced during previous URI or influenza-like illness as reported by the patient (free-text): Coughing, sore throat, upset stomach, loss of taste/smell, extreme fatigue        Pertinent medical history  Ayde typically gets a yeast infection when she takes antibiotics. She has used fluconazole (Diflucan) to treat previous yeast infections. 2 doses of fluconazole (Diflucan) has typically been needed for symptoms to resolve in the past.  Ayde does not need a return to work/school note.   Weight: 100 lbs   Ayde does not smoke or use smokeless tobacco.   She denies pregnancy and denies breastfeeding. She has menstruated in the past month.   Weight: 100 lbs    MEDICATIONS: Lexapro oral, ALLERGIES: NKDA  Clinician Response:  Dear Ayde,   Your symptoms show that you may have coronavirus (COVID-19). This illness can cause fever, cough and trouble breathing. Many people get a mild case and get better on their own. Some people can get very sick.  What should I do?  We would like to test you for this virus.   1. Please call [phone] to schedule your visit. Explain that you were referred by [facility] to have a COVID-19 test. Be ready to share your [facility] visit ID number.  The following will serve as your written order for this COVID Test, ordered by me, for the indication of suspected COVID [Z20.828]: The test will be ordered in [facility], our electronic health record, after you are scheduled. It will show as ordered and authorized  "by Marcio Handy MD.  Order: COVID-19 (Coronavirus) PCR for SYMPTOMATIC testing from [facility].    2. When it's time for your COVID test:  Stay at least 6 feet away from others. (If someone will drive you to your test, stay in the backseat, as far away from the  as you can.)   Cover your mouth and nose with a mask, tissue or washcloth.  Go straight to the testing site. Don't make any stops on the way there or back.    3.Starting now: Stay home and away from others (self-isolate) until:   You've had no fever---and no medicine that reduces fever---for one full day (24 hours). And...  Your other symptoms have gotten better. For example, your cough or breathing has improved. And...  At least 10 days have passed since your symptoms started.    During this time, don't leave the house except for testing or medical care.   Stay in your own room, even for meals. Use your own bathroom if you can.   Stay away from others in your home. No hugging, kissing or shaking hands. No visitors.  Don't go to work, school or anywhere else.    Clean \"high touch\" surfaces often (doorknobs, counters, handles, etc.). Use a household cleaning spray or wipes. You'll find a full list of  on the EPA website: www.epa.gov/pesticide-registration/list-n-disinfectants-use-against-sars-cov-2.   Cover your mouth and nose with a mask, tissue or washcloth to avoid spreading germs.  Wash your hands and face often. Use soap and water.  Caregivers in these groups are at risk for severe illness due to COVID-19:  o People 65 years and older  o People who live in a nursing home or long-term care facility  o People with chronic disease (lung, heart, cancer, diabetes, kidney, liver, immunologic)  o People who have a weakened immune system, including those who:   Are in cancer treatment  Take medicine that weakens the immune system, such as corticosteroids  Had a bone marrow or organ transplant  Have an immune deficiency  Have poorly controlled HIV or " AIDS  Are obese (body mass index of 40 or higher)  Smoke regularly   o Caregivers should wear gloves while washing dishes, handling laundry and cleaning bedrooms and bathrooms.  o Use caution when washing and drying laundry: Don't shake dirty laundry, and use the warmest water setting that you can.  o For more tips, go to www.cdc.gov/coronavirus/2019-ncov/downloads/10Things.pdf.   4.Sign up for [employer]. We know it's scary to hear that you might have COVID-19. We want to track your symptoms to make sure you're okay over the next 2 weeks. Please look for an email from [employer]---this is a free, online program that we'll use to keep in touch. To sign up, follow the link in the email. Learn more at [email or web address]  How can I take care of myself?   Get lots of rest. Drink extra fluids(unless a doctor has told you not to).  Take Tylenol (acetaminophen) for fever or pain. If you have liver or kidney problems, ask your family doctor if it's okay to take Tylenol.   Adults can take either:    650 mg (two 325 mg pills) every 4 to 6 hours, or...  1,000 mg (two 500 mg pills) every 8 hours as needed.   Note: Don't take more than 3,000 mg in one day. Acetaminophen is found in many medicines (both prescribed and over-the-counter medicines). Read all labels to be sure you don't take too much.   For children, check the Tylenol bottle for the right dose. The dose is based on the child's age or weight.    If you have other health problems (like cancer, heart failure, an organ transplant or severe kidney disease):Call your specialty clinic if you don't feel better in the next 2 days.    Know when to call 911. Emergency warning signs include:   Trouble breathing or shortness of breath Pain or pressure in the chest that doesn't go away Feeling confused like you haven't felt before, or not being able to wake up Bluish-colored lips or face.  Where can I get more information?   [facility] -- About COVID-19:  [email or web address]  CDC -- What to Do If You're Sick: www.cdc.gov/coronavirus/2019-ncov/about/steps-when-sick.html  CDC -- Ending Home Isolation: www.cdc.gov/coronavirus/2019-ncov/hcp/disposition-in-home-patients.html  CDC -- Caring for Someone: www.cdc.gov/coronavirus/2019-ncov/if-you-are-sick/care-for-someone.html  [facility] -- Interim Guidance for Hospital Discharge to Home: www.[facility].[place]./diseases/coronavirus/hcp/hospdischarge.pdf  [facility] clinical trials (COVID-19 research studies): clinicalaffairs.[facility]-clinical-trials  Below are the COVID-19 hotlines at the [place] Department of Health ([facility]). Interpreters are available.    For health questions: Call 321-070-3716 or 1-382.495.3883 (7 a.m. to 7 p.m.) For questions about schools and childcare: Call 746-599-3214 or 1-181.807.1804 (7 a.m. to 7 p.m.)    Diagnosis: Chills (without fever)  Diagnosis ICD: R68.83

## 2020-08-17 DIAGNOSIS — Z20.822 SUSPECTED 2019 NOVEL CORONAVIRUS INFECTION: ICD-10-CM

## 2020-08-17 DIAGNOSIS — Z20.822 SUSPECTED 2019 NOVEL CORONAVIRUS INFECTION: Primary | ICD-10-CM

## 2020-08-17 PROCEDURE — U0003 INFECTIOUS AGENT DETECTION BY NUCLEIC ACID (DNA OR RNA); SEVERE ACUTE RESPIRATORY SYNDROME CORONAVIRUS 2 (SARS-COV-2) (CORONAVIRUS DISEASE [COVID-19]), AMPLIFIED PROBE TECHNIQUE, MAKING USE OF HIGH THROUGHPUT TECHNOLOGIES AS DESCRIBED BY CMS-2020-01-R: HCPCS | Performed by: FAMILY MEDICINE

## 2020-08-18 LAB
SARS-COV-2 RNA SPEC QL NAA+PROBE: NOT DETECTED
SPECIMEN SOURCE: NORMAL

## 2020-10-14 DIAGNOSIS — F32.A DEPRESSION, UNSPECIFIED DEPRESSION TYPE: ICD-10-CM

## 2020-10-14 RX ORDER — ESCITALOPRAM OXALATE 20 MG/1
20 TABLET ORAL DAILY
Qty: 14 TABLET | Refills: 0 | Status: SHIPPED | OUTPATIENT
Start: 2020-10-14 | End: 2020-10-26

## 2020-10-14 NOTE — TELEPHONE ENCOUNTER
Medication requested: escitalopram (LEXAPRO) 20 MG tablet   Last refilled: 8/18/20  Qty: 14/0      Last seen: 4/6/20  RTC: 2-3 months  Cancel: 0  No-show: x 2 5/27, 6/15  Next appt: None    Refill decision:   Refill pended and routed to the provider for review/determination due to no chow x 2, no appt in place, outside of timeframe for return.  Scheduling has been notified to contact the pt for appointment.

## 2020-10-26 DIAGNOSIS — F32.A DEPRESSION, UNSPECIFIED DEPRESSION TYPE: ICD-10-CM

## 2020-10-26 RX ORDER — ESCITALOPRAM OXALATE 20 MG/1
20 TABLET ORAL DAILY
Qty: 30 TABLET | Refills: 0 | Status: SHIPPED | OUTPATIENT
Start: 2020-10-26 | End: 2020-12-07

## 2020-10-26 NOTE — TELEPHONE ENCOUNTER
Last seen: 4/6  RTC: 2-3 months   Cancel: 10/26  No-show: 6/15, 5/27  Next appt: 10/26    Incoming refill from patient via phone     Medication requested: escitalopram (LEXAPRO) 20 MG tablet  Directions: Take 1 tablet (20 mg) by mouth daily *SCHEDULE CLINIC APPT FOR REFILLS  [phone] - Oral  Qty: 14  Last refilled: 10/14    Will route to provider for approval

## 2020-10-26 NOTE — TELEPHONE ENCOUNTER
[facility] Call Center    Phone Message    May a detailed message be left on voicemail: yes     Reason for Call: Medication Refill Request    Has the patient contacted the pharmacy for the refill? Yes   Name of medication being requested: Lexapro  Provider who prescribed the medication: Hero  Pharmacy:    [id number] - [place], [place] - [address] AT [facility] [address]    Date medication is needed:10/27         Action Taken: Patient transferred to: [facility] [place]    Travel Screening: Not Applicable

## 2020-12-07 DIAGNOSIS — F32.A DEPRESSION, UNSPECIFIED DEPRESSION TYPE: ICD-10-CM

## 2020-12-07 RX ORDER — ESCITALOPRAM OXALATE 20 MG/1
20 TABLET ORAL DAILY
Qty: 30 TABLET | Refills: 0 | Status: SHIPPED | OUTPATIENT
Start: 2020-12-07 | End: 2021-01-28

## 2020-12-07 NOTE — TELEPHONE ENCOUNTER
Medication requested: escitalopram (LEXAPRO) 20 MG tablet   Last refilled: 10/26/20  Qty: 30/0      Last seen: 4/6/20  RTC: 2-3 months  Cancel: 2 10/26, 10/28  No-show: 2 6/15, 5/27  Next appt: None    Refill decision:   Refill pended and routed to the provider for review/determination due to  NSH x 2, CX x 2. No appt. Scheduling has been notified to contact the pt for appointment.

## 2021-01-03 ENCOUNTER — HEALTH MAINTENANCE LETTER (OUTPATIENT)
Age: 21
End: 2021-01-03

## 2021-01-28 ENCOUNTER — TELEPHONE (OUTPATIENT)
Dept: PSYCHIATRY | Facility: CLINIC | Age: 21
End: 2021-01-28

## 2021-01-28 DIAGNOSIS — F32.A DEPRESSION, UNSPECIFIED DEPRESSION TYPE: ICD-10-CM

## 2021-01-28 RX ORDER — ESCITALOPRAM OXALATE 20 MG/1
TABLET ORAL
Qty: 30 TABLET | Refills: 0 | Status: SHIPPED | OUTPATIENT
Start: 2021-01-28 | End: 2021-12-12

## 2021-01-28 NOTE — TELEPHONE ENCOUNTER
Writer received return phone call from the pt. She informed this writer that she was without insurance for approximately one month. Pt confirmed she is now insured again. Pt has been without Lexapro 20 mg for the last two weeks. She denies any significant side effects and would like to restart the medication if possible. Pt confirmed her appt for next Wednesday, 2/3.

## 2021-01-28 NOTE — TELEPHONE ENCOUNTER
Last seen: 4/6/20  RTC: 2-3 months  Cancel: 10/28, 10/26  No-show: 6/15, 5/27  Next appt: 2/3/21     Incoming refill from patient via telephone     Medication requested: escitalopram (LEXAPRO) 20 MG tablet  Directions: Sig - Route: Take 1 tablet (20 mg) by mouth daily   Qty: 30 tablet  Last refilled: 11/7 per outside med rec    -Writer attempted to call patient to inquire about how she has been taking her medications. No answer. LVM requesting call back.     Medication refill pended and routed to provider for approval.

## 2021-01-28 NOTE — TELEPHONE ENCOUNTER
-Medication filled by provider  -Writer called patient and LVM letting her know the prescription was filled

## 2021-01-28 NOTE — TELEPHONE ENCOUNTER
Brief Psychiatry note:    I called Ayde to discuss her medication refill request. She has been off the medication for about 2 weeks, and she is experiencing mild withdrawal symptoms. There was a lapse in her insurance. When she has run out of the medication in the past, she typically start back on her previous dose. This sometimes causes some GI upset. We made a plan to refill her medication, but she will take 10 mg daily x1 week, then increase to 20 mg daily. I will see her next Wednesday as well.    Richardson Monahan MD  Psychiatry PGY4

## 2021-01-28 NOTE — TELEPHONE ENCOUNTER
[facility] Call Center    Phone Message    May a detailed message be left on voicemail: yes     Reason for Call: Medication Refill Request    Has the patient contacted the pharmacy for the refill? Yes   Name of medication being requested: Lexapro  Provider who prescribed the medication: Hero  Pharmacy:    [facility] #[id number] - [place], [place] - [address] AT [facility] [address]      Date medication is needed: Today      Action Taken: Other: Sent to [facility]    Travel Screening: Not Applicable

## 2021-05-12 DIAGNOSIS — F32.A DEPRESSION, UNSPECIFIED DEPRESSION TYPE: ICD-10-CM

## 2021-05-13 RX ORDER — ESCITALOPRAM OXALATE 20 MG/1
TABLET ORAL
Qty: 30 TABLET | Refills: 0 | OUTPATIENT
Start: 2021-05-13 | End: 2021-06-20

## 2021-05-13 NOTE — TELEPHONE ENCOUNTER
Patient last seen over 1 year ago, with multiple subsequent no-shows. Last prescription should have run out over a month ago. Needs appointment before more refills are given.     Richardson Monahan  Psychiatry PGY4

## 2021-05-13 NOTE — TELEPHONE ENCOUNTER
Medication requested: escitalopram (LEXAPRO) 20 MG tablet  Last refilled: 1/28/21  Qty: 30      Last seen: 4/6/20  RTC: 2-3 months  Cancel: x 2  No-show: x 3  Next appt: 0    Refill decision:   Refill pended and routed to the provider for review/determination due to   CANCEL X 2  NO SHOW X 3  Pt outside of RTC timeframe.  Scheduling has been notified to contact the pt for appointment.

## 2021-06-08 DIAGNOSIS — F32.A DEPRESSION, UNSPECIFIED DEPRESSION TYPE: ICD-10-CM

## 2021-06-09 RX ORDER — ESCITALOPRAM OXALATE 20 MG/1
TABLET ORAL
Qty: 30 TABLET | Refills: 0 | OUTPATIENT
Start: 2021-06-09 | End: 2021-07-17

## 2021-06-09 NOTE — TELEPHONE ENCOUNTER
Medication requested: escitalopram (LEXAPRO) 20 MG tablet  Last refilled: 1/28/21  Qty: 30/0      Last seen: 4/6/20  RTC: 2-3 months  Cancel: 2  No-show: 3  Next appt: 0    Refill decision: DENIED  Per 5/12/21Dr Monahan  note: Needs appointment before more refills are given.

## 2021-08-23 DIAGNOSIS — F32.A DEPRESSION, UNSPECIFIED DEPRESSION TYPE: ICD-10-CM

## 2021-08-23 RX ORDER — ESCITALOPRAM OXALATE 20 MG/1
TABLET ORAL
Qty: 30 TABLET | Refills: 0 | OUTPATIENT
Start: 2021-08-23 | End: 2021-09-30

## 2021-08-23 NOTE — TELEPHONE ENCOUNTER
Medication requested: escitalopram (LEXAPRO) 20 MG tablet  Last refilled: 5/7/21  Qty: 30       Last seen: 4/6/20  RTC: 2-3 months  Cancel: x 2  No-show: x 3  Next appt: 0     Refill decision:   Refill pended and routed to the provider for review/determination due to   CANCEL X 2  NO SHOW X 3  Pt outside of RTC timeframe.  Scheduling has been notified to contact the pt for appointment.     NO NEW APPOINTMENTS HAVE BEEN SCHEDULED...   PER DR. CRUZ ON 5/12/21...Patient last seen over 1 year ago, with multiple subsequent no-shows. Last prescription should have run out over a month ago. Needs appointment before more refills are given.      Richardson Cruz  Psychiatry PGY4

## 2021-10-10 ENCOUNTER — HEALTH MAINTENANCE LETTER (OUTPATIENT)
Age: 21
End: 2021-10-10

## 2021-12-12 ENCOUNTER — TELEPHONE (OUTPATIENT)
Dept: BEHAVIORAL HEALTH | Facility: CLINIC | Age: 21
End: 2021-12-12

## 2021-12-12 ENCOUNTER — HOSPITAL ENCOUNTER (EMERGENCY)
Facility: CLINIC | Age: 21
Discharge: PSYCHIATRIC OR SUBSTANCE USE FACILITY | End: 2021-12-13
Attending: EMERGENCY MEDICINE | Admitting: EMERGENCY MEDICINE
Payer: COMMERCIAL

## 2021-12-12 ENCOUNTER — TELEPHONE (OUTPATIENT)
Dept: BEHAVIORAL HEALTH | Facility: CLINIC | Age: 21
End: 2021-12-12
Payer: COMMERCIAL

## 2021-12-12 DIAGNOSIS — R45.851 SUICIDAL IDEATION: ICD-10-CM

## 2021-12-12 LAB
AMPHETAMINES UR QL SCN: ABNORMAL
BARBITURATES UR QL: ABNORMAL
BENZODIAZ UR QL: ABNORMAL
CANNABINOIDS UR QL SCN: ABNORMAL
COCAINE UR QL: ABNORMAL
HCG UR QL: NEGATIVE
OPIATES UR QL SCN: ABNORMAL
SARS-COV-2 RNA RESP QL NAA+PROBE: NEGATIVE

## 2021-12-12 PROCEDURE — U0005 INFEC AGEN DETEC AMPLI PROBE: HCPCS | Performed by: EMERGENCY MEDICINE

## 2021-12-12 PROCEDURE — 250N000013 HC RX MED GY IP 250 OP 250 PS 637: Performed by: FAMILY MEDICINE

## 2021-12-12 PROCEDURE — C9803 HOPD COVID-19 SPEC COLLECT: HCPCS | Performed by: EMERGENCY MEDICINE

## 2021-12-12 PROCEDURE — 90791 PSYCH DIAGNOSTIC EVALUATION: CPT

## 2021-12-12 PROCEDURE — 81025 URINE PREGNANCY TEST: CPT | Performed by: EMERGENCY MEDICINE

## 2021-12-12 PROCEDURE — 99285 EMERGENCY DEPT VISIT HI MDM: CPT | Mod: 25 | Performed by: EMERGENCY MEDICINE

## 2021-12-12 PROCEDURE — 99285 EMERGENCY DEPT VISIT HI MDM: CPT | Performed by: EMERGENCY MEDICINE

## 2021-12-12 PROCEDURE — 80307 DRUG TEST PRSMV CHEM ANLYZR: CPT | Performed by: EMERGENCY MEDICINE

## 2021-12-12 RX ORDER — ALBUTEROL SULFATE 90 UG/1
2 AEROSOL, METERED RESPIRATORY (INHALATION) EVERY 6 HOURS PRN
Status: DISCONTINUED | OUTPATIENT
Start: 2021-12-12 | End: 2021-12-13 | Stop reason: HOSPADM

## 2021-12-12 RX ORDER — ESCITALOPRAM OXALATE 20 MG/1
20 TABLET ORAL DAILY
Status: DISCONTINUED | OUTPATIENT
Start: 2021-12-12 | End: 2021-12-13 | Stop reason: HOSPADM

## 2021-12-12 RX ORDER — ESCITALOPRAM OXALATE 20 MG/1
20 TABLET ORAL DAILY
Status: ON HOLD | COMMUNITY
End: 2021-12-16

## 2021-12-12 RX ADMIN — ESCITALOPRAM OXALATE 20 MG: 20 TABLET ORAL at 19:39

## 2021-12-12 ASSESSMENT — ENCOUNTER SYMPTOMS
HALLUCINATIONS: 0
FEVER: 0
SLEEP DISTURBANCE: 1
SHORTNESS OF BREATH: 0
COUGH: 0
DYSPHORIC MOOD: 1
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
## 2021-12-12 NOTE — ED NOTES
Ayde N Faruq  December 12, 2021    SAFE Note    Critical Safety Issues: Pt to be admitted for suicidal ideation with a plan to overdose or jump off a bridge.      Current Suicidal Ideation/Self-Injurious Concerns/Methods: Ingestion overdose on prescribed lexapro  and Jumping (from building, into traffic, etc.)  Pt has a hx of SIB by cutting on her upper right thigh.  States she has not engaged in SIB in over 6+ months.        Current or Historical Inappropriate Sexual Behavior: No      Current or Historical Aggression/Homicidal Ideation: None - N/A      Triggers: Increasing depressive symptoms, isolation, hx of trauma.    Updated care team: Yes: RN and MD aware    For additional details see full LMHP assessment.       STEPHANIE BertrandSW
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
## 2021-12-12 NOTE — TELEPHONE ENCOUNTER
S: 12:25p Damari w/ DEC called Intake w/ clinical on a 20y/F present in the [facility] w/ SI.     B:  The pt is currently at the [place] [facility] presenting w/ SI w/ plan. Plan includes overdose on Rx medication or jumping off a bridge.     The endorses using the following substances- marijuana & cocaine. Last use roughlt 2 weeks ago for cocaine and a few days ago for marijuana.     The pt has been not IP for MH before.    The pts MH Hx is as follows: depression & BRIANNE.     The pt is Rx MH medications. Medications are listed in Epic. The pt is complaint.     The pt does have a PCP that Rx their MH Rx.    The pts does have a therapist, Ashia White at [facility].    There is no concern for aggression this visit. There is no concern for HI.    Per  the pt can ambulate independently.     Per  the pt is indep with ADLs.    The pt does not have any known medical concerns.     Covid needs to be collected.  Utox needs to be collected.  Pregnancy test needs to be collected.    A: Vol- Pt want [facility]/ [facility] or [facility] only.    R: The pt is currently in the [place] [facility] awaiting bed placement.    12:31p Intake has added the pt to the work-list. Intake awaiting labs for bed placement.    1:34p Intake checked on labs for bed placement.  Covid is in process. Intake awaiting results for outside bed placement. [facility].

## 2021-12-12 NOTE — PHARMACY-ADMISSION MEDICATION HISTORY
Admission Medication History Completed by Pharmacy    See [facility] Admission Navigator for allergy information, preferred outpatient pharmacy, prior to admission medications and immunization status.     Medication History Sources:     Patient, dispense report     Changes made to PTA medication list (reason):    Added: None    Deleted: None    Changed:   o Escitalopram 20 mg tablet: take 0.5 tablet po daily x7d then 1 tablet thereafter--> Escitalopram 20 mg tablet: take 1 tablet po daily, per patient     Additional Information:    None    Prior to Admission medications    Medication Sig Last Dose Taking? Auth Provider   albuterol (PROAIR HFA/PROVENTIL HFA/VENTOLIN HFA) 108 (90 Base) MCG/ACT inhaler Inhale 2 puffs into the lungs every 6 hours as needed for shortness of breath / dyspnea or wheezing Unknown at Unknown time Yes Reported, Patient   escitalopram (LEXAPRO) 20 MG tablet Take 20 mg by mouth daily 12/11/2021 at Unknown time Yes Reported, Patient       Date completed: 12/12/21    Medication history completed by: Radha Turcios             H/O abdominal hysterectomy    H/O sinus surgery    History of cataract extraction, unspecified laterality    History of cholecystectomy    History of open reduction and internal fixation (ORIF) procedure  left wrist x4, pt reports she needs to have surgery again  History of total knee replacement, right    Malignant melanoma of neck  s/p wide excision 2015
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
## 2021-12-13 ENCOUNTER — HOSPITAL ENCOUNTER (INPATIENT)
Facility: CLINIC | Age: 21
LOS: 3 days | Discharge: HOME | DRG: 885 | End: 2021-12-16
Attending: PSYCHIATRY & NEUROLOGY | Admitting: PSYCHIATRY & NEUROLOGY
Payer: COMMERCIAL

## 2021-12-13 VITALS
OXYGEN SATURATION: 99 % | DIASTOLIC BLOOD PRESSURE: 60 MMHG | TEMPERATURE: 98.2 F | HEART RATE: 90 BPM | RESPIRATION RATE: 16 BRPM | SYSTOLIC BLOOD PRESSURE: 127 MMHG

## 2021-12-13 DIAGNOSIS — F99 INSOMNIA DUE TO OTHER MENTAL DISORDER: ICD-10-CM

## 2021-12-13 DIAGNOSIS — J45.909 ASTHMA, UNSPECIFIED ASTHMA SEVERITY, UNSPECIFIED WHETHER COMPLICATED, UNSPECIFIED WHETHER PERSISTENT: ICD-10-CM

## 2021-12-13 DIAGNOSIS — F41.1 GENERALIZED ANXIETY DISORDER: ICD-10-CM

## 2021-12-13 DIAGNOSIS — F31.81 BIPOLAR 2 DISORDER, MAJOR DEPRESSIVE EPISODE (H): Primary | ICD-10-CM

## 2021-12-13 DIAGNOSIS — F51.05 INSOMNIA DUE TO OTHER MENTAL DISORDER: ICD-10-CM

## 2021-12-13 PROBLEM — R45.86 MOOD CHANGES: Status: ACTIVE | Noted: 2021-12-13

## 2021-12-13 PROCEDURE — 124N000001 HC R&B MH

## 2021-12-13 PROCEDURE — 128N000001 HC R&B CD/MH ADULT

## 2021-12-13 PROCEDURE — 250N000013 HC RX MED GY IP 250 OP 250 PS 637: Performed by: FAMILY MEDICINE

## 2021-12-13 RX ORDER — HYDROXYZINE HYDROCHLORIDE 25 MG/1
25 TABLET, FILM COATED ORAL EVERY 4 HOURS PRN
Status: DISCONTINUED | OUTPATIENT
Start: 2021-12-13 | End: 2021-12-16 | Stop reason: HOSPADM

## 2021-12-13 RX ORDER — MAGNESIUM HYDROXIDE/ALUMINUM HYDROXICE/SIMETHICONE 120; 1200; 1200 MG/30ML; MG/30ML; MG/30ML
30 SUSPENSION ORAL EVERY 4 HOURS PRN
Status: DISCONTINUED | OUTPATIENT
Start: 2021-12-13 | End: 2021-12-16 | Stop reason: HOSPADM

## 2021-12-13 RX ORDER — ESCITALOPRAM OXALATE 10 MG/1
20 TABLET ORAL DAILY
Status: DISCONTINUED | OUTPATIENT
Start: 2021-12-14 | End: 2021-12-16 | Stop reason: HOSPADM

## 2021-12-13 RX ORDER — TRAZODONE HYDROCHLORIDE 50 MG/1
50 TABLET, FILM COATED ORAL
Status: DISCONTINUED | OUTPATIENT
Start: 2021-12-13 | End: 2021-12-16 | Stop reason: HOSPADM

## 2021-12-13 RX ORDER — ACETAMINOPHEN 325 MG/1
650 TABLET ORAL EVERY 4 HOURS PRN
Status: DISCONTINUED | OUTPATIENT
Start: 2021-12-13 | End: 2021-12-16 | Stop reason: HOSPADM

## 2021-12-13 RX ORDER — ALBUTEROL SULFATE 90 UG/1
2 AEROSOL, METERED RESPIRATORY (INHALATION) EVERY 6 HOURS PRN
Status: DISCONTINUED | OUTPATIENT
Start: 2021-12-13 | End: 2021-12-16 | Stop reason: HOSPADM

## 2021-12-13 RX ADMIN — ESCITALOPRAM OXALATE 20 MG: 20 TABLET ORAL at 11:40

## 2021-12-13 ASSESSMENT — ACTIVITIES OF DAILY LIVING (ADL)
LAUNDRY: WITH SUPERVISION
FALL_HISTORY_WITHIN_LAST_SIX_MONTHS: NO
DRESS: INDEPENDENT
DIFFICULTY_COMMUNICATING: NO
DIFFICULTY_EATING/SWALLOWING: NO
PATIENT_/_FAMILY_COMMUNICATION_STYLE: SPOKEN LANGUAGE (ENGLISH OR BILINGUAL)
CONCENTRATING,_REMEMBERING_OR_MAKING_DECISIONS_DIFFICULTY: NO
DRESSING/BATHING_DIFFICULTY: NO
ORAL_HYGIENE: INDEPENDENT
ORAL_HYGIENE: INDEPENDENT
DOING_ERRANDS_INDEPENDENTLY_DIFFICULTY: NO
TOILETING_ISSUES: NO
HYGIENE/GROOMING: INDEPENDENT
WALKING_OR_CLIMBING_STAIRS_DIFFICULTY: NO
HEARING_DIFFICULTY_OR_DEAF: NO
HYGIENE/GROOMING: INDEPENDENT
LAUNDRY: WITH SUPERVISION
DRESS: INDEPENDENT
WEAR_GLASSES_OR_BLIND: NO

## 2021-12-13 ASSESSMENT — MIFFLIN-ST. JEOR: SCORE: 1091.1

## 2021-12-13 NOTE — TELEPHONE ENCOUNTER
2335 Bed Search Update ([facility]):    [facility]-No beds available  [facility]-No beds available  [facility]-No beds available  [facility] beds available  [facility]-No beds available  [facility]-No beds available  [facility]-No beds available  [facility]-No beds available    Pt remains on wait list pending bed availability

## 2021-12-13 NOTE — ED NOTES
Attempted to call [facility]'s 5500 to give handoff report, RN stated the receiving nurse, Tate, is currently on break but will call ED when able.

## 2021-12-13 NOTE — PHARMACY-ADMISSION MEDICATION HISTORY
Admission medication history completed at [facility]. Please see Pharmacy - Admission Medication History note from 12/12/2021.    Marc Cordero RPH on 12/13/2021 at 4:17 PM

## 2021-12-13 NOTE — ED PROVIDER NOTES
Emergency Department Patient Sign-out       Brief HPI:  This is a 21 year old female signed out to me by Dr. Rodrigues .  See initial ED Provider note for details of the presentation.          Patient is medically cleared for admission to a Behavioral Health unit.      The patient is not on a hold.      The patient has not required medication for agitation.    Awaiting Transfer to Mental Health Facility        Significant Events prior to my assuming care: 21-year-old with a history of major depression and generalized anxiety disorder who has been seen and evaluated and deemed in need of psychiatric admission due to being emotionally dysregulated and having suicidal ideation.  She is awaiting bed placement.      Exam:   Patient Vitals for the past 24 hrs:   BP Temp Temp src Pulse Resp SpO2   12/12/21 1506 98/63 98.1  F (36.7  C) Oral 85 15 100 %           ED RESULTS:   Results for orders placed or performed during the hospital encounter of 12/12/21 (from the past 24 hour(s))   Asymptomatic COVID-19 Virus (Coronavirus) by PCR Nasopharyngeal     Status: Normal    Collection Time: 12/12/21 12:27 PM    Specimen: Nasopharyngeal; Swab   Result Value Ref Range    SARS CoV2 PCR Negative Negative    Narrative    Testing was performed using the huber  SARS-CoV-2 & Influenza A/B Assay on the huber  [employer]  System.  This test should be ordered for the detection of SARS-COV-2 in individuals who meet SARS-CoV-2 clinical and/or epidemiological criteria. Test performance is unknown in asymptomatic patients.  This test is for in vitro diagnostic use under the FDA EUA for laboratories certified under CLIA to perform moderate and/or high complexity testing. This test has not been FDA cleared or approved.  A negative test does not rule out the presence of PCR inhibitors in the specimen or target RNA in concentration below the limit of detection for the assay. The possibility of a false negative should be considered if the patient's  recent exposure or clinical presentation suggests COVID-19.  [facility] are certified under the Clinical Laboratory Improvement Amendments of 1988 (CLIA-88) as qualified to perform moderate and/or high complexity laboratory testing.   HCG qualitative urine (UPT)     Status: Normal    Collection Time: 12/12/21 12:47 PM   Result Value Ref Range    hCG Urine Qualitative Negative Negative   Urine Drugs of Abuse Screen     Status: Abnormal    Collection Time: 12/12/21 12:47 PM    Narrative    The following orders were created for panel order Urine Drugs of Abuse Screen.  Procedure                               Abnormality         Status                     ---------                               -----------         ------                     Drug abuse screen 1 urin...[[id number]]  Abnormal            Final result                 Please view results for these tests on the individual orders.   Drug abuse screen 1 urine (ED)     Status: Abnormal    Collection Time: 12/12/21 12:47 PM   Result Value Ref Range    Amphetamines Urine Screen Negative Screen Negative    Barbiturates Urine Screen Negative Screen Negative    Benzodiazepines Urine Screen Negative Screen Negative    Cannabinoids Urine Screen Positive (A) Screen Negative    Cocaine Urine Screen Negative Screen Negative    Opiates Urine Screen Negative Screen Negative       ED MEDICATIONS:   Medications - No data to display      Impression:    ICD-10-CM    1. Suicidal ideation  R45.851        Plan:    Pending studies include none.  We are continuing to await bed placement.  Will order regular home medications..        MD Madina Bhakta David, MD  12/12/21 9435

## 2021-12-13 NOTE — ED NOTES
[facility] ED Mental Health Handoff Note:       Brief HPI:  This is a 21 year old female signed out to me by Dr. Mayen.  See initial ED Provider note for full details of the presentation.     Home meds reviewed and ordered/administered: Yes    Medically stable for inpatient mental health admission: Yes.    Evaluated by mental health: Yes. The recommendation is for inpatient mental health treatment. Bed search in process    Safety concerns: At the time I received sign out, there were no safety concerns.    Hold Status:  Active Orders   Legal    Health Officer Authority to Detain (NATE)     Frequency: Effective Now     Start Date/Time: 12/13/21 1350      Number of Occurrences: Until Specified     Order Comments: Transportation hold              Exam:   Patient Vitals for the past 24 hrs:   BP Temp Temp src Pulse Resp SpO2   12/13/21 1527 127/60 -- -- 90 16 --   12/13/21 1118 96/62 98.2  F (36.8  C) Oral 85 -- 99 %   12/13/21 0558 103/61 97.9  F (36.6  C) -- 81 18 100 %       Physical Exam  Constitutional:       General: She is not in acute distress.     Appearance: She is not diaphoretic.   HENT:      Head: Atraumatic.      Mouth/Throat:      Pharynx: No oropharyngeal exudate.   Eyes:      General: No scleral icterus.     Pupils: Pupils are equal, round, and reactive to light.   Cardiovascular:      Heart sounds: Normal heart sounds.   Pulmonary:      Effort: No respiratory distress.      Breath sounds: Normal breath sounds.   Abdominal:      General: Bowel sounds are normal.      Palpations: Abdomen is soft.      Tenderness: There is no abdominal tenderness.   Musculoskeletal:         General: No tenderness.   Skin:     General: Skin is warm.      Findings: No rash.   Psychiatric:         Mood and Affect: Mood normal.         Behavior: Behavior normal.         ED Course:    Medications - No data to display         There were no significant events during my shift.          Impression:    ICD-10-CM    1.  Suicidal ideation  R45.851        Plan:    1. Admitted/transferred to inpatient mental health bed.      RESULTS:   No results found for this visit on 12/12/21 (from the past 24 hour(s)).          DO Jae Hdez Andrew Walton, DO  12/13/21 1655

## 2021-12-13 NOTE — TELEPHONE ENCOUNTER
R:  12-13-21  9 AM  Patient in the  ED awaiting IP MH placement.     R:  10:30 AM  Pt accepted on  5500 w/ Donny as admitting and attending  MD.  Placed in 5500 work Q-called and notified charge nurse.  She will call writer back after reviewing.   ED also notified.    R:  1 PM  OK for  ED to call 5500 for nurse to nurse then patient can transfer.   ED updated.

## 2021-12-13 NOTE — ED NOTES
Ayde Hughes is reviewed for [facility] service. Will follow and meet with patient/family/care team as able or requested.     Jairon Moyer  [facility], [facility]   [phone]

## 2021-12-14 PROBLEM — F43.10 PTSD (POST-TRAUMATIC STRESS DISORDER): Status: ACTIVE | Noted: 2021-12-14

## 2021-12-14 PROBLEM — F41.1 GENERALIZED ANXIETY DISORDER: Status: ACTIVE | Noted: 2021-12-14

## 2021-12-14 PROBLEM — F31.81 BIPOLAR 2 DISORDER, MAJOR DEPRESSIVE EPISODE (H): Status: ACTIVE | Noted: 2021-12-14

## 2021-12-14 PROBLEM — F12.90 MARIJUANA USE, CONTINUOUS: Status: ACTIVE | Noted: 2021-12-14

## 2021-12-14 LAB
CHOLEST SERPL-MCNC: 118 MG/DL
GLUCOSE BLD-MCNC: 71 MG/DL (ref 70–125)
HDLC SERPL-MCNC: 57 MG/DL
LDLC SERPL CALC-MCNC: 50 MG/DL
TRIGL SERPL-MCNC: 54 MG/DL

## 2021-12-14 PROCEDURE — 99223 1ST HOSP IP/OBS HIGH 75: CPT | Performed by: PSYCHIATRY & NEUROLOGY

## 2021-12-14 PROCEDURE — 80061 LIPID PANEL: CPT | Performed by: PSYCHIATRY & NEUROLOGY

## 2021-12-14 PROCEDURE — 36415 COLL VENOUS BLD VENIPUNCTURE: CPT | Performed by: PSYCHIATRY & NEUROLOGY

## 2021-12-14 PROCEDURE — 90853 GROUP PSYCHOTHERAPY: CPT

## 2021-12-14 PROCEDURE — 124N000001 HC R&B MH

## 2021-12-14 PROCEDURE — 250N000013 HC RX MED GY IP 250 OP 250 PS 637: Performed by: PSYCHIATRY & NEUROLOGY

## 2021-12-14 PROCEDURE — 82947 ASSAY GLUCOSE BLOOD QUANT: CPT | Performed by: PSYCHIATRY & NEUROLOGY

## 2021-12-14 PROCEDURE — 128N000001 HC R&B CD/MH ADULT

## 2021-12-14 RX ORDER — ARIPIPRAZOLE ORAL 1 MG/ML
5 SOLUTION ORAL DAILY
Status: DISCONTINUED | OUTPATIENT
Start: 2021-12-14 | End: 2021-12-16 | Stop reason: HOSPADM

## 2021-12-14 RX ADMIN — ESCITALOPRAM OXALATE 20 MG: 10 TABLET ORAL at 08:24

## 2021-12-14 RX ADMIN — ARIPIPRAZOLE 5 MG: 1 SOLUTION ORAL at 17:12

## 2021-12-14 ASSESSMENT — ACTIVITIES OF DAILY LIVING (ADL)
ORAL_HYGIENE: INDEPENDENT
DRESS: INDEPENDENT
HYGIENE/GROOMING: INDEPENDENT

## 2021-12-14 ASSESSMENT — MIFFLIN-ST. JEOR: SCORE: 1098.36

## 2021-12-14 NOTE — PLAN OF CARE
Problem: Mood Impairment (Anxiety Signs/Symptoms)  Goal: Improved Mood Symptoms (Anxiety Signs/Symptoms)  Outcome: Improving     Problem: Cognitive Impairment (Anxiety Signs/Symptoms)  Goal: Optimized Cognitive Function (Anxiety Signs/Symptoms)  Outcome: Improving     Problem: Suicidal Behavior  Goal: Suicidal Behavior is Absent or Managed  Outcome: Improving     Problem: Suicide Risk  Goal: Absence of Self-Harm  Outcome: Improving   Patient was pleasant, calm, cooperative with assessment. She rated 2 and depression 4. She denied of SI, HI, pain and hallucination. She was visible on unit but kept to self. She  ate well for both meals. She contracted for safety. Patient wants to  know if she can be discharged by tomorrow. She expressed that she misses her dog and her home. She stated that her anxiety and depression are low, and she feels she is ready to go home.  Explained to patient that we will check with MD. No s/s of alcohol withdrawal. Explained meds, care plan and activities to patient. Will continue to monitor.

## 2021-12-14 NOTE — PROGRESS NOTES
12/14/21 1513   Engagement   Intervention Group   Topic Detail OT: Scratch art manual skills task for attention to detail, frustration tolerance, creative expression, relaxation and coping with stress/sxs   Attendance Attended   Patient Response Demonstrated understanding of materials provided;Was respectful   Concentrated on Task 30 - 45 min   Cognition Goal-directed;Takes initiative;Follows through with task   Mood/Affect Content;Pleasant   Social/Behavioral Cooperative   Goals addressed in session today Pt joined group and was motivated by the activity; she worked independenlty for ~30 minutes and then opted to stop her project and read her book for remainder of group; pt appeared content.
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
## 2021-12-14 NOTE — PROGRESS NOTES
12/14/21 1300   Engagement   Intervention Group   Topic Insight development/self-awareness   Topic Detail SW Process Group: Feelings and Needs   Attendance Attended   Patient Response Expressed feelings/issues;Was respectful;Positive attitude   Concentrated on Task duration of group   Cognition Goal-directed   Mood/Affect Pleasant   Social/Behavioral Cooperative   Thought Content Reality oriented     GROUP LENGTH (MINS):   30 min   RELEVANT PRIMARY DIAGNOSIS: Patient Active Problem List   Diagnosis     Asthma     Depression, unspecified depression type     Mood changes        TREATMENT MODALITY:      [x]CBT       []DBT       []ACT       []Interpersonal psychotherapy                                 []Psychoeducational therapy       []Skill development       []Other:        ATTENDANCE:        [x]Stayed for entire group     []Arrived late    [] Left early       # OF PATIENTS IN GROUP: 4   BILLABLE:     [x]Yes            []No   Notes:
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
## 2021-12-14 NOTE — PLAN OF CARE
Problem: Sleep Disturbance  Goal: Adequate Sleep/Rest  Outcome: Improving     As of 0645, has slept > 7 hrs. Safety checks completed every 15 minutes. Suicide precautions continued. Pt has been sleeping uneventfully.

## 2021-12-14 NOTE — PLAN OF CARE
Problem: Behavioral Health Plan of Care  Goal: Plan of Care Review  Outcome: No Change     Problem: Suicide Risk  Goal: Absence of Self-Harm  Outcome: No Change     Problem: Suicidal Behavior  Goal: Suicidal Behavior is Absent or Managed  Outcome: No Change     Problem: Depression  Goal: Improved Mood  Outcome: No Change     Patient is up on the unit, pleasant, engaged and watched a movie with peers. Patient denies any pain, anxiety,SI/HI and depression. Patient reports does not take any med's before bedtime. Patient contracts for safety and offers no other concerns. Staff will continue to monitor.
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
## 2021-12-15 PROCEDURE — 250N000013 HC RX MED GY IP 250 OP 250 PS 637: Performed by: PSYCHIATRY & NEUROLOGY

## 2021-12-15 PROCEDURE — 99232 SBSQ HOSP IP/OBS MODERATE 35: CPT | Performed by: PSYCHIATRY & NEUROLOGY

## 2021-12-15 PROCEDURE — 90853 GROUP PSYCHOTHERAPY: CPT

## 2021-12-15 PROCEDURE — 124N000001 HC R&B MH

## 2021-12-15 PROCEDURE — 128N000001 HC R&B CD/MH ADULT

## 2021-12-15 RX ADMIN — ESCITALOPRAM OXALATE 20 MG: 10 TABLET ORAL at 08:23

## 2021-12-15 RX ADMIN — ARIPIPRAZOLE 5 MG: 1 SOLUTION ORAL at 09:26

## 2021-12-15 ASSESSMENT — ACTIVITIES OF DAILY LIVING (ADL)
ORAL_HYGIENE: INDEPENDENT
DRESS: INDEPENDENT
ORAL_HYGIENE: INDEPENDENT
HYGIENE/GROOMING: INDEPENDENT
HYGIENE/GROOMING: INDEPENDENT

## 2021-12-15 NOTE — PLAN OF CARE
Problem: Sleep Disturbance  Goal: Adequate Sleep/Rest  Outcome: Improving     As of 0600, has slept ~ 7 hrs. Safety checks completed every 15 minutes. Suicide precautions continued. Pt has been sleeping uneventfully.

## 2021-12-15 NOTE — PROGRESS NOTES
12/15/21 1531   Engagement   Intervention Group   Topic Detail OT: Nilesh lovell manual skills task for new learning, memory, follow through, attention to detail, problem solving and coping with stress/sxs   Attendance Attended   Patient Response Demonstrated understanding of materials provided;Was respectful   Concentrated on Task duration of group   Cognition Goal-directed;Takes initiative;Follows through with task   Mood/Affect Blunted;Content   Social/Behavioral Cooperative   Goals addressed in session today Pt shared that she is learning to play guitar, which is a good cognitive challenge for her; pt was able to learn the steps of the project easily and was self-directed with assembly; pt was content but minimally engaged in casual conversation among staff and peers.

## 2021-12-15 NOTE — PLAN OF CARE
Assessment/Intervention, Care Coordination and Current Symptoms:   CTC consulted with psychiatrist and met with patient. She rates current depression 3/10, anxiety 1/10, and denies any SI/HI. She reports wanting to discharge, but willing to allow time for medication adjustment and care coordination. CTC discussed aftercare options and patient agreeable to referrals for [facility] program and medication management. Patient plans to continue with her established therapist. Denied any other questions or concerns.    CTC consulted with patient's outpatient therapist, Ashia GARNICA [phone]. She reports recently discussing a referral to [facility] with patient and encourages a higher LOC. CTC scheduled aftercare appointment. YOVANI in chart.      CTC scheduled intake evaluation for [facility] and medication management at [facility]. ROIs in chart.       Discharge Plan or Goal:  Home with community supports        Barriers to Discharge:  symptom stabilization, medication management, coordination of care        Referral Status:    [facility]  [facility]    Established Provider:  Ashia GARNICA - [facility] [phone]       Legal Status:  Voluntary     Ashia Liang [facility], 12/15/2021, 3:10 PM

## 2021-12-15 NOTE — PLAN OF CARE
Problem: Behavioral Health Plan of Care  Goal: Plan of Care Review  Outcome: Improving  Flowsheets (Taken 12/15/2021 0900)  Plan of Care Reviewed With: patient  Patient Agreement with Plan of Care: agrees     Problem: Suicide Risk  Goal: Absence of Self-Harm  Outcome: Improving   Patient reported feeling more tired today possibly due to abilify. She napped after breakfast. Got up, ate lunch, took a shower. She was hoping she will feel more awaked after shower. Patient rated anxiety 0 and depression 4/10. She denied SI, HI, pain and hallucination. She was having some nausea this am. Encouraged pt to eat first before meds were given and that seem to help. Text massage sent to MD for PRN med for her nausea. Pt did feel more awake after her shower, she ate snack and went to 1430 group. No further complaint of nausea. Explained meds and care plan to plan to patient. Will continue to monitor.

## 2021-12-15 NOTE — PROGRESS NOTES
12/15/21 1300   Engagement   Intervention Group   Topic Detail SW Process: Values   Attendance Attended   Patient Response Demonstrated understanding of materials provided;Expressed feelings/issues;Positive attitude;Contributes to conversation   Concentrated on Task duration of group   Cognition Takes initiative   Mood/Affect Pleasant   Social/Behavioral Cooperative;Engaged   Thought Content Insightful     GROUP LENGTH (MINS):   30   RELEVANT PRIMARY DIAGNOSIS: Patient Active Problem List   Diagnosis     Asthma     Depression, unspecified depression type     Mood changes     Bipolar 2 disorder, major depressive episode (H)     Generalized anxiety disorder     PTSD (post-traumatic stress disorder)     Marijuana use, continuous        TREATMENT MODALITY:      []CBT       []DBT       []ACT       [x]Interpersonal psychotherapy                                 []Psychoeducational therapy       [x]Skill development       []Other:        ATTENDANCE:        [x]Stayed for entire group     []Arrived late    [] Left early       # OF PATIENTS IN GROUP: 4   BILLABLE:     []Yes            [x]No   Notes:

## 2021-12-15 NOTE — PLAN OF CARE
Problem: Behavioral Health Plan of Care  Goal: Adheres to Safety Considerations for Self and Others  Outcome: Improving     Problem: Behavioral Health Plan of Care  Goal: Absence of New-Onset Illness or Injury  Outcome: Improving   Patient was visible on the unit at the beginning of the shift. Patient made a couple phone calls and took shower.Patient was isolative, did not participate in community meeting. Patient  isolated herself in her room after dinner.Patient refused to come out for hs snack. Patient was observed sleeping during frequency checks.Patient appeared calm, and guarded during assessment. Patient denies pain or discomfort.Patient denies anxiety, depression, and other psych symptoms. Contracts for safety.Suicide precautions ongoing.

## 2021-12-15 NOTE — PROGRESS NOTES
Occupational Therapy   AIDET    Patient was introduced to the role of occupational therapy and oriented to the process of occupational therapy services on the unit, including function of groups. Patient was given the Occupational Therapy Questionnaire to complete.  Patient was lying in bed and shared that she was feeling tired from her medications.  Pt was pleasant and responsive. OT will follow up with assessment and address any questions, needs, or concerns.    Therapist: OLYA Mejia/STACEY   12/15/2021

## 2021-12-16 VITALS
HEART RATE: 100 BPM | HEIGHT: 60 IN | OXYGEN SATURATION: 98 % | SYSTOLIC BLOOD PRESSURE: 105 MMHG | BODY MASS INDEX: 18.04 KG/M2 | TEMPERATURE: 98.1 F | RESPIRATION RATE: 16 BRPM | WEIGHT: 91.9 LBS | DIASTOLIC BLOOD PRESSURE: 63 MMHG

## 2021-12-16 PROBLEM — F10.10 ALCOHOL ABUSE: Status: ACTIVE | Noted: 2021-12-14

## 2021-12-16 PROCEDURE — 99239 HOSP IP/OBS DSCHRG MGMT >30: CPT | Performed by: PSYCHIATRY & NEUROLOGY

## 2021-12-16 PROCEDURE — 250N000013 HC RX MED GY IP 250 OP 250 PS 637: Performed by: PSYCHIATRY & NEUROLOGY

## 2021-12-16 RX ORDER — ARIPIPRAZOLE ORAL 1 MG/ML
5 SOLUTION ORAL EVERY MORNING
Qty: 30 ML | Refills: 0 | Status: SHIPPED | OUTPATIENT
Start: 2021-12-16

## 2021-12-16 RX ORDER — TRAZODONE HYDROCHLORIDE 50 MG/1
50 TABLET, FILM COATED ORAL
Qty: 30 TABLET | Refills: 0 | Status: SHIPPED | OUTPATIENT
Start: 2021-12-16 | End: 2021-12-16

## 2021-12-16 RX ORDER — ALBUTEROL SULFATE 90 UG/1
2 AEROSOL, METERED RESPIRATORY (INHALATION) EVERY 6 HOURS PRN
Qty: 18 G | Refills: 0 | Status: SHIPPED | OUTPATIENT
Start: 2021-12-16

## 2021-12-16 RX ORDER — TRAZODONE HYDROCHLORIDE 50 MG/1
50 TABLET, FILM COATED ORAL
Qty: 30 TABLET | Refills: 0 | Status: SHIPPED | OUTPATIENT
Start: 2021-12-16

## 2021-12-16 RX ORDER — HYDROXYZINE HYDROCHLORIDE 25 MG/1
25 TABLET, FILM COATED ORAL EVERY 4 HOURS PRN
Qty: 30 TABLET | Refills: 0 | Status: SHIPPED | OUTPATIENT
Start: 2021-12-16 | End: 2021-12-16

## 2021-12-16 RX ORDER — ARIPIPRAZOLE ORAL 1 MG/ML
5 SOLUTION ORAL AT BEDTIME
Qty: 30 ML | Refills: 0 | Status: SHIPPED | OUTPATIENT
Start: 2021-12-16 | End: 2021-12-16

## 2021-12-16 RX ORDER — ESCITALOPRAM OXALATE 20 MG/1
20 TABLET ORAL DAILY
Qty: 30 TABLET | Refills: 0 | Status: SHIPPED | OUTPATIENT
Start: 2021-12-16 | End: 2021-12-16

## 2021-12-16 RX ORDER — ALBUTEROL SULFATE 90 UG/1
2 AEROSOL, METERED RESPIRATORY (INHALATION) EVERY 6 HOURS PRN
Qty: 18 G | Refills: 0 | Status: SHIPPED | OUTPATIENT
Start: 2021-12-16 | End: 2021-12-16

## 2021-12-16 RX ORDER — HYDROXYZINE HYDROCHLORIDE 25 MG/1
25 TABLET, FILM COATED ORAL EVERY 4 HOURS PRN
Qty: 30 TABLET | Refills: 0 | Status: SHIPPED | OUTPATIENT
Start: 2021-12-16

## 2021-12-16 RX ORDER — ARIPIPRAZOLE ORAL 1 MG/ML
5 SOLUTION ORAL EVERY MORNING
Qty: 30 ML | Refills: 0 | Status: SHIPPED | OUTPATIENT
Start: 2021-12-16 | End: 2021-12-16

## 2021-12-16 RX ORDER — ESCITALOPRAM OXALATE 20 MG/1
20 TABLET ORAL DAILY
Qty: 30 TABLET | Refills: 0 | Status: SHIPPED | OUTPATIENT
Start: 2021-12-16

## 2021-12-16 RX ADMIN — ESCITALOPRAM OXALATE 20 MG: 10 TABLET ORAL at 08:51

## 2021-12-16 RX ADMIN — ARIPIPRAZOLE 5 MG: 1 SOLUTION ORAL at 08:51
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
## 2021-12-16 NOTE — PLAN OF CARE
"  Problem: Behavioral Health Plan of Care  Goal: Patient-Specific Goal (Individualization)  Outcome: Adequate for Discharge  Note: Shift Summery:  pt being discharged. Denies SI or HI. Feels ready and \"safe to go home\"     Patient's Stated Goal for Shift: go home     Goal Status:  goal in progress.        "

## 2021-12-16 NOTE — PROGRESS NOTES
Pt's grandmother arrived for . Pt again escorted to exit. Medications now arrived from [facility] pharmacy but sent back due to our pharmacist sending prescriptions to pt's own [facility] for . Pt discharged to home.pt agrees to  medications at [facility]. Pt escorted to exit in ambulatory status

## 2021-12-16 NOTE — PLAN OF CARE
Discharge plan or goal: Home with community supports    Today's Plan: [facility] consulted psychiatrist and met with patient for discharge. Patient reports feeling excited to discharge home and see her dog. She denies any SI. Patient plans to contact her friend or grandma for a ride home and denied needing any assistance from [facility] to coordinate this. [facility] reviewed aftercare appointments with patient and she is in agreement with this plan. 30-day supply of medications provided to patient upon discharge.     Pt has the following outpatient appointment(s) scheduled:   Appointment Type: Psychotherapy  Provider: Ashia GARNICA  Date & Time: Thursday, December 16th at 2:00PM  Clinic: [facility]  Phone: [phone]  Additional Notes: This is a virtual appointment. If you need to cancel, reschedule, or have any questions regarding this appointment please call [phone].    Appointment Type: Medication Management  Provider: Carley Barraza PA-C  Date & Time: Wednesday, January 12th at 12:55PM  Clinic: [facility]  Phone: [phone]  Additional Notes: This is a virtual appointment. You will receive the video link to your e-mail address the evening prior to your schedule appointment. If you need to cancel, reschedule, or have any other questions regarding this appointment please call [phone].    Appointment Type: Intake Evaluation  Provider: Carley Eddy  Date & Time: Tuesday, January 25th at 9:00AM  Clinic: [facility]  Address: [address] [place] [address]  Phone: [phone]  Additional Notes: This is an in-person appointment. If you need to cancel, reschedule, or have any other questions regarding this appointment please call  [phone].      Pt has the following professional community support(s): psychotherapy, medication management, coordination of care    Legal Status: Voluntary    Diagnosis/Procedure:   Patient Active Problem List   Diagnosis     Asthma     Depression, unspecified  depression type     Mood changes     Bipolar 2 disorder, major depressive episode (H)     Generalized anxiety disorder     PTSD (post-traumatic stress disorder)     Marijuana use, continuous     Alcohol abuse       Care Rounds Attendance:   SWCM   RN   Charge RN   OT/TR  MD/CNP    Ashia Liang, [facility], 12/16/2021, 9:48 AM

## 2021-12-16 NOTE — PLAN OF CARE
Problem: Suicidal Behavior  Goal: Suicidal Behavior is Absent or Managed  Outcome: Improving     Problem: Sleep Disturbance (Anxiety Signs/Symptoms)  Goal: Improved Sleep (Anxiety Signs/Symptoms)  Outcome: Improving     Problem: Somatic Disturbance (Anxiety Signs/Symptoms)  Goal: Improved Somatic Symptoms (Anxiety Signs/Symptoms)  Outcome: Improving     Problem: Sleep Disturbance  Goal: Adequate Sleep/Rest  Outcome: Improving     Patient slept for the most part of the night, patient did not complain of pain or anxiety and made no requests for prn medications or snack. Patient was cooperative with safety checks with no demonstrated anger, outburst, paranoia, physical/verbal aggression toward staff or peers. Patient endorses no SI/HI, A/VH or SIB. No evidence of withdrawal or respiratory distress.  Staff will continue to monitor and document mood, behavior, thought process  and thought contents as well as establishing and maintaining a therapeutic relationship and rapport with patient    Nolan Brown DNP, RN, APRN, CNS, AGCNS-BC

## 2021-12-16 NOTE — DISCHARGE INSTRUCTIONS
Behavioral Discharge Planning and Instructions    Summary: You were admitted on 12/13/2021 due to Suicidal Ideations.  You were treated by Dr Lynda Hines and discharged on 12/16/2021 from [facility] to Home    Main Diagnosis: Depression    Health Care Follow-up:   Appointment Type: Psychotherapy  Provider: Ashia GARNICA  Date & Time: Thursday, December 16th at 2:00PM  Clinic: [facility]  Phone: [phone]  Additional Notes: This is a virtual appointment. If you need to cancel, reschedule, or have any questions regarding this appointment please call [phone].    Appointment Type: Medication Management  Provider: Carley Barraza PA-C  Date & Time: Wednesday, January 12th at 12:55PM  Clinic: [facility]  Phone: [phone]  Additional Notes: This is a virtual appointment. You will receive the video link to your e-mail address the evening prior to your schedule appointment. If you need to cancel, reschedule, or have any other questions regarding this appointment please call [phone].    Appointment Type: Intake Evaluation  Provider: Carley Eddy  Date & Time: Tuesday, January 25th at 9:00AM  Clinic: [facility]  Address: [facility] [place] [address]  Phone: [phone]  Additional Notes: This is an in-person appointment. If you need to cancel, reschedule, or have any other questions regarding this appointment please call  [phone].    Attend all scheduled appointments with your outpatient providers. Call at least 24 hours in advance if you need to reschedule an appointment to ensure continued access to your outpatient providers.     Major Treatments, Procedures and Findings:  You were provided with: a psychiatric assessment, medication evaluation and/or management, group therapy and milieu management    Symptoms to Report: mood getting worse or thoughts of suicide    Early warning signs can include: increased depression or anxiety increased thoughts or behaviors  "of suicide or self-harm     Safety and Wellness:  Take all medicines as directed.  Make no changes unless your doctor suggests them.      Follow treatment recommendations.  Refrain from alcohol and non-prescribed drugs.  If there is a concern for safety, call 911.    Resources:   National [place] on Mental Illness (www.[place].elizabeth.org): 442.182.6072 or 013-313-5545.  National Suicide Prevention Line (www.mentalhealthmn.org): 905-351-YBSC (9123)  [facility] Crisis (COPE) Response - Adult 055 199-2489  Crisis text line: Text \"[place]\" to 018616. Free, confidential, 24/7.    General Medication Instructions:   See your medication sheet(s) for instructions.   Take all medicines as directed.  Make no changes unless your doctor suggests them.   Go to all your doctor visits.  Be sure to have all your required lab tests. This way, your medicines can be refilled on time.  Do not use any drugs not prescribed by your doctor.  Avoid alcohol.    Advance Directives:   Scanned document on file with [employer]? No scanned doc  Is document scanned? No. Copy Requested.  Honoring Choices Your Rights Handout: Informed and given  Was more information offered? Pt declined    The Treatment team has appreciated the opportunity to work with you. If you have any questions or concerns about your recent admission, you can contact the unit which can receive your call 24 hours a day, 7 days a week. They will be able to get in touch with a Provider if needed. The unit number is [phone].  "

## 2021-12-16 NOTE — PROGRESS NOTES
Pt ride did not show up. Pt returned to unit. Awaiting grandma to pick pt up. Call out to grandmother.

## 2021-12-16 NOTE — PROGRESS NOTES
Pt will be discharged to her home. Her grandma is going to pick her up. Our [facility] pharmacy does not carry her liquid Abilify so all prescriptions were sent to her [facility] pharmacy. Coordinated by [facility] pharmacist.   AVS reviewed with patient, AVS discharge instructions and follow ups reviewed with patient. Pt able to verbalize a good understanding of medications and instructions. Continues to deny any SI or HI. Pt happy about discharge. Going home to her home and lives with roommate. Pt agrees and contracts to be safe. Instructed to call 911 or go to ER if unsafe and emergency crisis numbers provided. Pt ambulated to exit by behavior staff.     Please see AVS for further data.

## 2021-12-16 NOTE — PLAN OF CARE
"  Problem: Suicide Risk  Goal: Absence of Self-Harm  Outcome: Improving     Problem: Behavioral Health Plan of Care  Goal: Absence of New-Onset Illness or Injury  Outcome: Improving  Intervention: Identify and Manage Fall Risk  Recent Flowsheet Documentation  Taken 12/15/2021 1900 by Fiorella Huerta RN  Safety Measures: safety plan reviewed   Patient was isolative in her room most of the shift. Patient was out for dinner with encouragement. Patient complained of \"being tired\". Patient was observed sleeping during safety checks. Patient rated anxiety 0 and depression 0/10. Patient denies  SI, HI, pain and hallucination. Contracts for safety. Patient did not come out for hs snack.Suicide precautions ongoing.      "
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
## 2022-01-13 DIAGNOSIS — F31.81 BIPOLAR 2 DISORDER, MAJOR DEPRESSIVE EPISODE (H): ICD-10-CM

## 2022-01-13 DIAGNOSIS — F41.1 GENERALIZED ANXIETY DISORDER: ICD-10-CM

## 2022-01-14 RX ORDER — ESCITALOPRAM OXALATE 20 MG/1
TABLET ORAL
Qty: 30 TABLET | Refills: 0 | OUTPATIENT
Start: 2022-01-14

## 2022-01-30 ENCOUNTER — HEALTH MAINTENANCE LETTER (OUTPATIENT)
Age: 22
End: 2022-01-30

## 2022-09-18 ENCOUNTER — HEALTH MAINTENANCE LETTER (OUTPATIENT)
Age: 22
End: 2022-09-18

## 2023-05-07 ENCOUNTER — HEALTH MAINTENANCE LETTER (OUTPATIENT)
Age: 23
End: 2023-05-07

## 2024-07-14 ENCOUNTER — HEALTH MAINTENANCE LETTER (OUTPATIENT)
Age: 24
End: 2024-07-14